# Patient Record
Sex: FEMALE | Race: WHITE | HISPANIC OR LATINO
[De-identification: names, ages, dates, MRNs, and addresses within clinical notes are randomized per-mention and may not be internally consistent; named-entity substitution may affect disease eponyms.]

---

## 2017-07-12 ENCOUNTER — APPOINTMENT (OUTPATIENT)
Dept: UROLOGY | Facility: CLINIC | Age: 57
End: 2017-07-12

## 2017-08-09 ENCOUNTER — MEDICATION RENEWAL (OUTPATIENT)
Age: 57
End: 2017-08-09

## 2017-08-23 ENCOUNTER — APPOINTMENT (OUTPATIENT)
Dept: UROLOGY | Facility: CLINIC | Age: 57
End: 2017-08-23
Payer: COMMERCIAL

## 2017-08-23 VITALS
HEART RATE: 63 BPM | HEIGHT: 59 IN | WEIGHT: 170 LBS | SYSTOLIC BLOOD PRESSURE: 118 MMHG | BODY MASS INDEX: 34.27 KG/M2 | RESPIRATION RATE: 16 BRPM | DIASTOLIC BLOOD PRESSURE: 76 MMHG | TEMPERATURE: 98 F

## 2017-08-23 PROCEDURE — 99213 OFFICE O/P EST LOW 20 MIN: CPT

## 2018-02-26 ENCOUNTER — OUTPATIENT (OUTPATIENT)
Dept: OUTPATIENT SERVICES | Facility: HOSPITAL | Age: 58
LOS: 1 days | End: 2018-02-26
Payer: COMMERCIAL

## 2018-02-26 ENCOUNTER — APPOINTMENT (OUTPATIENT)
Dept: UROLOGY | Facility: CLINIC | Age: 58
End: 2018-02-26
Payer: COMMERCIAL

## 2018-02-26 VITALS
HEIGHT: 59 IN | RESPIRATION RATE: 17 BRPM | HEART RATE: 66 BPM | SYSTOLIC BLOOD PRESSURE: 123 MMHG | BODY MASS INDEX: 31.85 KG/M2 | DIASTOLIC BLOOD PRESSURE: 80 MMHG | WEIGHT: 158 LBS | TEMPERATURE: 97.6 F

## 2018-02-26 DIAGNOSIS — R35.0 FREQUENCY OF MICTURITION: ICD-10-CM

## 2018-02-26 PROCEDURE — 76775 US EXAM ABDO BACK WALL LIM: CPT

## 2018-02-26 PROCEDURE — 76775 US EXAM ABDO BACK WALL LIM: CPT | Mod: 26

## 2018-02-26 PROCEDURE — 99213 OFFICE O/P EST LOW 20 MIN: CPT | Mod: 25

## 2018-02-27 DIAGNOSIS — Z87.442 PERSONAL HISTORY OF URINARY CALCULI: ICD-10-CM

## 2018-03-08 ENCOUNTER — MEDICATION RENEWAL (OUTPATIENT)
Age: 58
End: 2018-03-08

## 2018-08-27 ENCOUNTER — APPOINTMENT (OUTPATIENT)
Dept: UROLOGY | Facility: CLINIC | Age: 58
End: 2018-08-27

## 2018-10-24 ENCOUNTER — APPOINTMENT (OUTPATIENT)
Dept: UROLOGY | Facility: CLINIC | Age: 58
End: 2018-10-24
Payer: COMMERCIAL

## 2018-10-24 PROCEDURE — 99213 OFFICE O/P EST LOW 20 MIN: CPT

## 2019-02-15 ENCOUNTER — APPOINTMENT (OUTPATIENT)
Dept: UROLOGY | Facility: CLINIC | Age: 59
End: 2019-02-15
Payer: COMMERCIAL

## 2019-03-06 ENCOUNTER — OUTPATIENT (OUTPATIENT)
Dept: OUTPATIENT SERVICES | Facility: HOSPITAL | Age: 59
LOS: 1 days | End: 2019-03-06
Payer: COMMERCIAL

## 2019-03-06 ENCOUNTER — APPOINTMENT (OUTPATIENT)
Dept: UROLOGY | Facility: CLINIC | Age: 59
End: 2019-03-06
Payer: COMMERCIAL

## 2019-03-06 VITALS
TEMPERATURE: 98.4 F | SYSTOLIC BLOOD PRESSURE: 128 MMHG | HEART RATE: 76 BPM | HEIGHT: 59 IN | BODY MASS INDEX: 34.27 KG/M2 | RESPIRATION RATE: 17 BRPM | WEIGHT: 170 LBS | DIASTOLIC BLOOD PRESSURE: 79 MMHG

## 2019-03-06 DIAGNOSIS — R35.0 FREQUENCY OF MICTURITION: ICD-10-CM

## 2019-03-06 PROCEDURE — 99213 OFFICE O/P EST LOW 20 MIN: CPT | Mod: 25

## 2019-03-06 PROCEDURE — 76775 US EXAM ABDO BACK WALL LIM: CPT | Mod: 26

## 2019-03-06 PROCEDURE — 76775 US EXAM ABDO BACK WALL LIM: CPT

## 2019-03-06 NOTE — ASSESSMENT
[FreeTextEntry1] : US today shows possible 6mm stone vs. calcification in the right kidney near parenchyma and a 2.7mm in lower pole left kidney.\par Strongly advised pt to increase fluid intake. \par Advised pt to continue HCTZ and K Chloride (she has not been taking K Chloride routinely).\par LL in 5 months and RTO in 6 months for results and US

## 2019-03-06 NOTE — ADDENDUM
[FreeTextEntry1] :  I, Nathalie Brasher, acted solely as a scribe for Dr. Lucho Carrion. The documentation recorded by the scribe accurately reflects the service I personally performed and the decision by me.\par

## 2019-03-06 NOTE — HISTORY OF PRESENT ILLNESS
[FreeTextEntry1] : 58 year old female presents for evaluation of test results.\par  passed away in July 2018 from diabetes/HTN/and alcoholism.\par She takes care of 3 children at home. \par US today shows possible 6mm stone vs. calcification in the right kidney near parenchyma and a 2.7mm in lower pole left kidney. \par LL from 02/20/19 shows low output still and high Ca level she is SS CaOx of 13.6 and SS CaP.\par Strongly advised pt to increase fluid intake. \par Advised pt to continue HCTZ and K Chloride (she has not been taking K Chloride routinely).\par Renewed HCTZ\par LL in 5 months and RTO in 6 months for results and US\par

## 2019-03-07 DIAGNOSIS — N20.0 CALCULUS OF KIDNEY: ICD-10-CM

## 2019-07-22 ENCOUNTER — APPOINTMENT (OUTPATIENT)
Dept: OBGYN | Facility: CLINIC | Age: 59
End: 2019-07-22
Payer: COMMERCIAL

## 2019-07-22 VITALS
SYSTOLIC BLOOD PRESSURE: 109 MMHG | HEIGHT: 59 IN | WEIGHT: 177 LBS | BODY MASS INDEX: 35.68 KG/M2 | DIASTOLIC BLOOD PRESSURE: 74 MMHG

## 2019-07-22 PROCEDURE — 99396 PREV VISIT EST AGE 40-64: CPT

## 2019-07-22 NOTE — HISTORY OF PRESENT ILLNESS
[1 Year Ago] : 1 year ago [Good] : being in good health [Healthy Diet] : a healthy diet [Regular Exercise] : regular exercise [Weight Concerns] : no concerns with her weight [Last Pap Smear ___] : last Papanicolaou cytology done [unfilled] [Mammogram Last Year] : a mammogram last year [Colonoscopy Within 10 Years] : a colonoscopy within the past ten years [Menstrual Problems] : reports normal menses [Sexually Active] : is sexually active [Up to Date] : up to date with ~his/her~ STD screening

## 2019-07-25 LAB — HPV HIGH+LOW RISK DNA PNL CVX: NOT DETECTED

## 2019-07-26 LAB — CYTOLOGY CVX/VAG DOC THIN PREP: NORMAL

## 2019-09-20 ENCOUNTER — MED ADMIN CHARGE (OUTPATIENT)
Age: 59
End: 2019-09-20

## 2019-09-20 ENCOUNTER — APPOINTMENT (OUTPATIENT)
Age: 59
End: 2019-09-20
Payer: COMMERCIAL

## 2019-09-20 ENCOUNTER — OUTPATIENT (OUTPATIENT)
Dept: OUTPATIENT SERVICES | Facility: HOSPITAL | Age: 59
LOS: 1 days | End: 2019-09-20
Payer: COMMERCIAL

## 2019-09-20 DIAGNOSIS — R35.0 FREQUENCY OF MICTURITION: ICD-10-CM

## 2019-09-20 PROCEDURE — 99213 OFFICE O/P EST LOW 20 MIN: CPT | Mod: 25

## 2019-09-20 PROCEDURE — 76775 US EXAM ABDO BACK WALL LIM: CPT | Mod: 26

## 2019-09-20 PROCEDURE — 76775 US EXAM ABDO BACK WALL LIM: CPT

## 2019-09-20 NOTE — ASSESSMENT
[FreeTextEntry1] : US today again visualized bilateral non obstructing calculi. There is a 4.0 x 5.6 mm mid stone vs parenchymal calcification stable in size a 2.7 mm in the lower pole right kidney and in the left 2.7 mm lower pole stable in size. Both kidneys are normal in size and echogenicity without obvious hydronephrosis or solid masses visualized. \par LL from 09/09/19 shows very low output, SS CaOx, elevated Ca, SS CaP, SS UA and elevated UA.\par Pt was advised to increase fluid intake. \par LL in 5 months and RTO in 6 months for results and US

## 2019-09-20 NOTE — ADDENDUM
[FreeTextEntry1] :  I, Nathalie Brasher, acted solely as a scribe for Dr. Lucho Carrion. The documentation recorded by the scribe accurately reflects the service I personally performed and the decision by me.

## 2019-09-20 NOTE — HISTORY OF PRESENT ILLNESS
[FreeTextEntry1] : 58 year old female presents for evaluation of test results.\par  passed away in July 2018 from diabetes/HTN/and alcoholism.\par She takes care of 3 children at home. \par \par US today again visualized bilateral non obstructing calculi. There is a 4.0 x 5.6 mm mid stone vs parenchymal calcification stable in size a 2.7 mm in the lower pole right kidney and in the left 2.7 mm lower pole stable in size. Both kidneys are normal in size and echogenicity without obvious hydronephrosis or solid masses visualized. \par LL from 09/09/19 shows very low output, SS CaOx, elevated Ca, SS CaP, SS UA and elevated UA.\par Pt was advised to increase fluid intake. \par LL in 5 months and RTO in 6 months for results and US

## 2019-09-23 DIAGNOSIS — N20.0 CALCULUS OF KIDNEY: ICD-10-CM

## 2019-09-28 ENCOUNTER — EMERGENCY (EMERGENCY)
Facility: HOSPITAL | Age: 59
LOS: 0 days | Discharge: HOME | End: 2019-09-28
Attending: EMERGENCY MEDICINE
Payer: OTHER MISCELLANEOUS

## 2019-09-28 VITALS
HEART RATE: 71 BPM | OXYGEN SATURATION: 98 % | DIASTOLIC BLOOD PRESSURE: 74 MMHG | TEMPERATURE: 98 F | SYSTOLIC BLOOD PRESSURE: 118 MMHG | RESPIRATION RATE: 17 BRPM

## 2019-09-28 VITALS
TEMPERATURE: 99 F | SYSTOLIC BLOOD PRESSURE: 125 MMHG | HEIGHT: 59 IN | RESPIRATION RATE: 18 BRPM | DIASTOLIC BLOOD PRESSURE: 68 MMHG | WEIGHT: 179.9 LBS | OXYGEN SATURATION: 99 % | HEART RATE: 83 BPM
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
## 2019-09-28 DIAGNOSIS — M79.641 PAIN IN RIGHT HAND: ICD-10-CM

## 2019-09-28 DIAGNOSIS — S69.91XA UNSPECIFIED INJURY OF RIGHT WRIST, HAND AND FINGER(S), INITIAL ENCOUNTER: ICD-10-CM
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
## 2019-09-28 DIAGNOSIS — J45.909 UNSPECIFIED ASTHMA, UNCOMPLICATED: ICD-10-CM
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
## 2019-09-28 PROCEDURE — 99053 MED SERV 10PM-8AM 24 HR FAC: CPT

## 2019-09-28 PROCEDURE — 99283 EMERGENCY DEPT VISIT LOW MDM: CPT

## 2019-09-28 PROCEDURE — 73130 X-RAY EXAM OF HAND: CPT | Mod: 26,RT

## 2019-09-28 RX ORDER — OXYCODONE AND ACETAMINOPHEN 5; 325 MG/1; MG/1
1 TABLET ORAL
Qty: 12 | Refills: 0
Start: 2019-09-28 | End: 2019-09-30

## 2019-09-28 RX ORDER — IBUPROFEN 200 MG
1 TABLET ORAL
Qty: 28 | Refills: 0
Start: 2019-09-28 | End: 2019-10-04

## 2019-09-28 RX ORDER — OXYCODONE AND ACETAMINOPHEN 5; 325 MG/1; MG/1
0.5 TABLET ORAL ONCE
Refills: 0 | Status: DISCONTINUED | OUTPATIENT
Start: 2019-09-28 | End: 2019-09-28

## 2019-09-28 RX ADMIN — OXYCODONE AND ACETAMINOPHEN 0.5 TABLET(S): 5; 325 TABLET ORAL at 06:03

## 2019-09-28 NOTE — ED ADULT NURSE NOTE - PMH
Asthma  - on singulair    Diverticulitis    Gall Stone    History of Urinary Tract Infection - on augmentin

## 2019-09-28 NOTE — ED ADULT NURSE NOTE - OBJECTIVE STATEMENT
58y female received in bed 9 c/o of right hand pain, swelling and small laceration s/p shelf falling on hand at work x 3am. able to move fingers. no s/s of acute distress noted. will continue to monitor and provide care as needed.

## 2019-09-28 NOTE — ED PROVIDER NOTE - PATIENT PORTAL LINK FT
You can access the FollowMyHealth Patient Portal offered by [facility] by registering at the following website: [email or web address]. By joining [facility]’s FollowMyHealth portal, you will also be able to view your health information using other applications (apps) compatible with our system.

## 2019-09-28 NOTE — ED PROVIDER NOTE - OBJECTIVE STATEMENT
Pertinent PMH/PSH/FHx/SHx and Review of Systems contained within:    59yo F w PMH of asthma presents to ED for eval s/p crush injury to R hand.  Pt was at work brandon a shelf fell down onto R hand.  Denies other injuries.  Pt is R handed.    No fever/chills, No photophobia/eye pain/changes in vision, No ear pain/sore throat/dysphagia, No chest pain/palpitations, no SOB/cough/wheeze/stridor, No abdominal pain, No neck/back pain, no rash, no changes in neurological status/function.

## 2019-09-28 NOTE — ED ADULT NURSE NOTE - ED STAT RN HANDOFF DETAILS
Report endorsed to oncoming RN. Safety checks compld this shift/Safety rounds completed hourly.  WDL. Meds given as ord with no s/s of adverse RXNs. Fall +skin precs in place. Any issues endorsed to oncoming RN for follow up.

## 2019-09-28 NOTE — ED PROVIDER NOTE - CLINICAL SUMMARY MEDICAL DECISION MAKING FREE TEXT BOX
Pt w crush injury to R hand, XR neg for fx.  Wounds dressed w abx ointment & pt instructed on care.  Given Hand follow up.  Pt given Rx and instructed/cautioned on use. Dc home to follow up w PMD & Dr. Lemus, instructed to return to ED if sx worsen.

## 2019-09-28 NOTE — ED PROVIDER NOTE - PHYSICAL EXAMINATION
Gen: Alert, c/o pain  Head: NC, AT, EOMI, normal lids/conjunctiva  ENT: normal hearing, patent oropharynx, MMM  Neck: supple, no tenderness/meningismus, FROM, Trachea midline  Pulm: Bilateral clear BS, normal resp effort, no wheeze/stridor/retractions  CV: RRR, no M/R/G, +dist pulses  Abd: soft, NT/ND, +BS, no guarding/rebound tenderness  Mskel: +small lacerations/abrasions between R index & middle finger, no active bleeding, +swelling & ecchymoses over 2nd-5th MCP joints, sensation intact, cap refill <2sec, radial pulse +2, decr ROM at MCP joints due to pain  Skin: no rash  Neuro: no sensory/motor deficits

## 2019-09-28 NOTE — ED ADULT TRIAGE NOTE - CHIEF COMPLAINT QUOTE
Pt c/o R-hand pain and swelling. Pt stated a top shelf at work fell down onto hand. swelling and small laceration seen R hand in triage

## 2019-10-04 ENCOUNTER — MOBILE ON CALL (OUTPATIENT)
Age: 59
End: 2019-10-04

## 2019-10-07 ENCOUNTER — MOBILE ON CALL (OUTPATIENT)
Age: 59
End: 2019-10-07

## 2019-10-14 ENCOUNTER — APPOINTMENT (OUTPATIENT)
Dept: CARDIOLOGY | Facility: CLINIC | Age: 59
End: 2019-10-14
Payer: COMMERCIAL

## 2019-10-14 ENCOUNTER — NON-APPOINTMENT (OUTPATIENT)
Age: 59
End: 2019-10-14

## 2019-10-14 VITALS
HEART RATE: 84 BPM | DIASTOLIC BLOOD PRESSURE: 80 MMHG | BODY MASS INDEX: 35.48 KG/M2 | SYSTOLIC BLOOD PRESSURE: 100 MMHG | HEIGHT: 59 IN | RESPIRATION RATE: 16 BRPM | WEIGHT: 176 LBS

## 2019-10-14 DIAGNOSIS — Z01.419 ENCOUNTER FOR GYNECOLOGICAL EXAMINATION (GENERAL) (ROUTINE) W/OUT ABNORMAL FINDINGS: ICD-10-CM

## 2019-10-14 PROCEDURE — 99214 OFFICE O/P EST MOD 30 MIN: CPT

## 2019-10-14 PROCEDURE — 93000 ELECTROCARDIOGRAM COMPLETE: CPT

## 2019-10-14 RX ORDER — HYDROCHLOROTHIAZIDE 25 MG/1
25 TABLET ORAL DAILY
Qty: 90 | Refills: 3 | Status: DISCONTINUED | COMMUNITY
Start: 2018-10-24 | End: 2019-10-14

## 2019-10-14 RX ORDER — HYDROCHLOROTHIAZIDE 25 MG/1
25 TABLET ORAL DAILY
Qty: 90 | Refills: 3 | Status: DISCONTINUED | COMMUNITY
Start: 2019-02-12 | End: 2019-10-14

## 2019-10-14 RX ORDER — HYDROCHLOROTHIAZIDE 25 MG/1
25 TABLET ORAL DAILY
Qty: 90 | Refills: 3 | Status: DISCONTINUED | COMMUNITY
Start: 2018-03-08 | End: 2019-10-14

## 2019-10-14 RX ORDER — HYDROCHLOROTHIAZIDE 25 MG/1
25 TABLET ORAL DAILY
Qty: 90 | Refills: 3 | Status: DISCONTINUED | COMMUNITY
Start: 2019-03-06 | End: 2019-10-14

## 2019-10-14 RX ORDER — HYDROCHLOROTHIAZIDE 25 MG/1
25 TABLET ORAL DAILY
Qty: 90 | Refills: 3 | Status: DISCONTINUED | COMMUNITY
Start: 2017-08-15 | End: 2019-10-14

## 2019-10-14 RX ORDER — POTASSIUM CHLORIDE 750 MG/1
10 CAPSULE, EXTENDED RELEASE ORAL
Qty: 90 | Refills: 3 | Status: DISCONTINUED | COMMUNITY
Start: 2018-10-24 | End: 2019-10-14

## 2019-10-14 NOTE — HISTORY OF PRESENT ILLNESS
[FreeTextEntry1] : Patient returns to the office having seen me for a few years and having recently had an episode of chest pain which prompted a visit to the ER. There she was found to have had a non-ST elevation MI and underwent catheterization showing total occlusion of the first diagonal where she received 2 stents. She tolerated this well and was discharged. Echocardiogram showed preserved EF and no significant wall motion abnormality. She did have a CTA of the chest to rule out PE which demonstrated a left lung mass but was otherwise unremarkable. Since being home she continues to report some pressure in the chest but no symptoms of chest pain similar to what she had at the time of the MI. She also complains of some discomfort and swelling in the right groin where she had her cath.  Patient denies shortness of breath, palpitations, orthopnea, presyncope, syncope.

## 2019-10-14 NOTE — PHYSICAL EXAM
[General Appearance - In No Acute Distress] : no acute distress [Normal Oral Mucosa] : normal oral mucosa [Normal Conjunctiva] : the conjunctiva exhibited no abnormalities [Auscultation Breath Sounds / Voice Sounds] : lungs were clear to auscultation bilaterally [Abdomen Soft] : soft [Abnormal Walk] : normal gait [Abdomen Tenderness] : non-tender [Nail Clubbing] : no clubbing of the fingernails [Skin Color & Pigmentation] : normal skin color and pigmentation [Cyanosis, Localized] : no localized cyanosis [Affect] : the affect was normal [Oriented To Time, Place, And Person] : oriented to person, place, and time [Normal Rate] : normal [Rhythm Regular] : regular [Normal S1] : normal S1 [Normal S2] : normal S2 [I] : a grade 1 [S3] : no S3 [S4] : no S4 [FreeTextEntry1] : R groin with ecchymosis and small hematoma noted

## 2019-10-14 NOTE — DISCUSSION/SUMMARY
[FreeTextEntry1] : 1. Decrease metoprolol to b.i.d.\par 2. She will continue off HCTZ and follow up with urology regarding her kidney stones.\par 3. Continue other current cardiac meds in doses as noted above for CAD.\par 4. She will have pre-rehabilitation stress test and start cardiac rehabilitation in about a month.\par 5. She will remain out of work for at least the next few months given the heavy work that she does in her recent MI.\par 6. Follow up here in six weeks.

## 2019-10-14 NOTE — ASSESSMENT
[FreeTextEntry1] : EKG: Sinus rhythm with no significant ST or T wave changes.  Frequent PVCs.\par \par 58-year-old female with a past medical history of pericarditis and asthma who recently presented with chest pain to the ER was found to have a non-ST elevation MI. Patient successfully received 2 stents. Echocardiogram showed a preserved EF. No evidence of heart failure. Patient still having some mild chest discomfort but this is likely more pericarditic in nature. Evaluation of her groin does show some ecchymosis and a small hematoma. There is certainly no evidence of any ongoing ischemia at this time. Blood pressure continues to be on the low side however.

## 2019-11-01 ENCOUNTER — RX RENEWAL (OUTPATIENT)
Age: 59
End: 2019-11-01

## 2019-11-01 ENCOUNTER — MEDICATION RENEWAL (OUTPATIENT)
Age: 59
End: 2019-11-01

## 2019-11-01 RX ORDER — ASPIRIN 81 MG
81 TABLET, DELAYED RELEASE (ENTERIC COATED) ORAL DAILY
Refills: 3 | Status: DISCONTINUED | COMMUNITY
End: 2019-11-01

## 2019-11-04 ENCOUNTER — APPOINTMENT (OUTPATIENT)
Dept: CARDIOLOGY | Facility: CLINIC | Age: 59
End: 2019-11-04
Payer: COMMERCIAL

## 2019-11-04 PROCEDURE — 93015 CV STRESS TEST SUPVJ I&R: CPT

## 2019-11-11 ENCOUNTER — APPOINTMENT (OUTPATIENT)
Dept: CARDIOLOGY | Facility: CLINIC | Age: 59
End: 2019-11-11
Payer: COMMERCIAL

## 2019-11-11 PROCEDURE — 93798 PHYS/QHP OP CAR RHAB W/ECG: CPT

## 2019-11-13 ENCOUNTER — APPOINTMENT (OUTPATIENT)
Dept: CARDIOLOGY | Facility: CLINIC | Age: 59
End: 2019-11-13

## 2019-11-15 ENCOUNTER — APPOINTMENT (OUTPATIENT)
Dept: CARDIOLOGY | Facility: CLINIC | Age: 59
End: 2019-11-15

## 2019-11-18 ENCOUNTER — NON-APPOINTMENT (OUTPATIENT)
Age: 59
End: 2019-11-18

## 2019-11-18 ENCOUNTER — APPOINTMENT (OUTPATIENT)
Dept: CARDIOLOGY | Facility: CLINIC | Age: 59
End: 2019-11-18

## 2019-11-18 ENCOUNTER — APPOINTMENT (OUTPATIENT)
Dept: CARDIOLOGY | Facility: CLINIC | Age: 59
End: 2019-11-18
Payer: COMMERCIAL

## 2019-11-18 VITALS
BODY MASS INDEX: 35.48 KG/M2 | WEIGHT: 176 LBS | HEIGHT: 59 IN | OXYGEN SATURATION: 99 % | RESPIRATION RATE: 15 BRPM | HEART RATE: 75 BPM | DIASTOLIC BLOOD PRESSURE: 68 MMHG | SYSTOLIC BLOOD PRESSURE: 101 MMHG

## 2019-11-18 PROCEDURE — 93000 ELECTROCARDIOGRAM COMPLETE: CPT

## 2019-11-18 PROCEDURE — 99214 OFFICE O/P EST MOD 30 MIN: CPT

## 2019-11-18 RX ORDER — METOPROLOL TARTRATE 25 MG/1
25 TABLET, FILM COATED ORAL TWICE DAILY
Qty: 180 | Refills: 1 | Status: DISCONTINUED | COMMUNITY
Start: 1900-01-01 | End: 2019-11-18

## 2019-11-18 NOTE — PHYSICAL EXAM
[Normal Conjunctiva] : the conjunctiva exhibited no abnormalities [General Appearance - In No Acute Distress] : no acute distress [Normal Oral Mucosa] : normal oral mucosa [Abdomen Soft] : soft [Auscultation Breath Sounds / Voice Sounds] : lungs were clear to auscultation bilaterally [Abnormal Walk] : normal gait [Abdomen Tenderness] : non-tender [Cyanosis, Localized] : no localized cyanosis [Nail Clubbing] : no clubbing of the fingernails [Oriented To Time, Place, And Person] : oriented to person, place, and time [Skin Color & Pigmentation] : normal skin color and pigmentation [Affect] : the affect was normal [Normal Rate] : normal [Rhythm Regular] : regular [Normal S1] : normal S1 [Normal S2] : normal S2 [I] : a grade 1 [S4] : no S4 [S3] : no S3 [FreeTextEntry1] : R groin with ecchymosis and small hematoma noted

## 2019-11-18 NOTE — HISTORY OF PRESENT ILLNESS
[FreeTextEntry1] : Patient returns to office today unfortunately having been diagnosed now with lung cancer. She had a biopsy for which Plavix was held for 3 days although I was not consulted prior to it being done. There were apparently no complications. She is now following up and may need a surgical evaluation. Symptomatically she reports a lot of fatigue when she exerts herself especially going up stairs. She also has chest pain with inspiration. No other specific symptoms at this time. She did one session of cardiac rehabilitation, reporting a lot of fatigue but she did get through it. Patient denies palpitations, orthopnea, presyncope, syncope.

## 2019-11-18 NOTE — DISCUSSION/SUMMARY
[FreeTextEntry1] : 1. Change metoprolol tartrate to succinate 50 mg daily to improve compliance. Enforced upon her the need to be compliant with her other medications as well.\par 2. Continue other current cardiac meds in doses as noted above for CAD.\par 3. Followup with pulmonary and ultimately thoracic surgery regarding her lung cancer. If it is determined that she needs to have surgery this likely will need to be done withholding of her antiplatelet therapy. The shortest duration of holding the medication would be recommended with careful monitoring. Might consider inpatient time period.\par 4. She can return to work at this time doing light duty.\par 5. She is encouraged to continue with cardiac rehabilitation.\par 6. Follow up here in six weeks.

## 2019-11-18 NOTE — ASSESSMENT
[FreeTextEntry1] : EKG: Sinus rhythm with nonspecific T wave changes.\par \par 59-year-old female with a past medical history of pericarditis and asthma who recently presented with chest pain to the ER was found to have a non-ST elevation MI s/p  2 stents presenting for followup. Patient unfortunately has been diagnosed with lung cancer and is currently undergoing further evaluation. She may ultimately require surgery for the cancer. Unfortunately given her recent stenting there is a significant danger and holding her telemetry platelet therapy for extended periods of time. She would be at risk for stent thrombosis although the longer we can wait to hold the medication to better. Given the importance of having surgery to remove the cancer possibly ultimately this is likely a chance we would have to take. At this time there is no evidence of recurrent ischemia or CHF. Blood pressure is controlled. She did one episode of cardiac rehabilitation and did have some ventricular ectopy which appears to be asymptomatic.

## 2019-11-20 ENCOUNTER — APPOINTMENT (OUTPATIENT)
Dept: CARDIOLOGY | Facility: CLINIC | Age: 59
End: 2019-11-20

## 2019-11-22 ENCOUNTER — APPOINTMENT (OUTPATIENT)
Dept: CARDIOLOGY | Facility: CLINIC | Age: 59
End: 2019-11-22

## 2019-11-25 ENCOUNTER — APPOINTMENT (OUTPATIENT)
Dept: CARDIOLOGY | Facility: CLINIC | Age: 59
End: 2019-11-25
Payer: COMMERCIAL

## 2019-11-25 PROCEDURE — 93798 PHYS/QHP OP CAR RHAB W/ECG: CPT

## 2019-11-27 ENCOUNTER — APPOINTMENT (OUTPATIENT)
Dept: CARDIOLOGY | Facility: CLINIC | Age: 59
End: 2019-11-27

## 2019-12-02 ENCOUNTER — APPOINTMENT (OUTPATIENT)
Dept: CARDIOLOGY | Facility: CLINIC | Age: 59
End: 2019-12-02

## 2019-12-04 ENCOUNTER — APPOINTMENT (OUTPATIENT)
Dept: CARDIOLOGY | Facility: CLINIC | Age: 59
End: 2019-12-04
Payer: COMMERCIAL

## 2019-12-04 PROCEDURE — 93798 PHYS/QHP OP CAR RHAB W/ECG: CPT

## 2019-12-05 ENCOUNTER — APPOINTMENT (OUTPATIENT)
Dept: CARDIOLOGY | Facility: CLINIC | Age: 59
End: 2019-12-05

## 2019-12-06 ENCOUNTER — APPOINTMENT (OUTPATIENT)
Dept: CARDIOLOGY | Facility: CLINIC | Age: 59
End: 2019-12-06
Payer: COMMERCIAL

## 2019-12-06 PROCEDURE — 93798 PHYS/QHP OP CAR RHAB W/ECG: CPT

## 2019-12-09 ENCOUNTER — APPOINTMENT (OUTPATIENT)
Dept: CARDIOLOGY | Facility: CLINIC | Age: 59
End: 2019-12-09

## 2019-12-11 ENCOUNTER — APPOINTMENT (OUTPATIENT)
Dept: CARDIOLOGY | Facility: CLINIC | Age: 59
End: 2019-12-11

## 2019-12-13 ENCOUNTER — APPOINTMENT (OUTPATIENT)
Dept: CARDIOLOGY | Facility: CLINIC | Age: 59
End: 2019-12-13

## 2019-12-16 ENCOUNTER — APPOINTMENT (OUTPATIENT)
Dept: CARDIOLOGY | Facility: CLINIC | Age: 59
End: 2019-12-16

## 2019-12-18 ENCOUNTER — APPOINTMENT (OUTPATIENT)
Dept: CARDIOLOGY | Facility: CLINIC | Age: 59
End: 2019-12-18

## 2019-12-20 ENCOUNTER — APPOINTMENT (OUTPATIENT)
Dept: CARDIOLOGY | Facility: CLINIC | Age: 59
End: 2019-12-20

## 2019-12-23 ENCOUNTER — APPOINTMENT (OUTPATIENT)
Dept: CARDIOLOGY | Facility: CLINIC | Age: 59
End: 2019-12-23

## 2019-12-27 ENCOUNTER — APPOINTMENT (OUTPATIENT)
Dept: CARDIOLOGY | Facility: CLINIC | Age: 59
End: 2019-12-27

## 2019-12-30 ENCOUNTER — APPOINTMENT (OUTPATIENT)
Dept: CARDIOLOGY | Facility: CLINIC | Age: 59
End: 2019-12-30

## 2019-12-31 ENCOUNTER — APPOINTMENT (OUTPATIENT)
Dept: CARDIOLOGY | Facility: CLINIC | Age: 59
End: 2019-12-31
Payer: COMMERCIAL

## 2019-12-31 ENCOUNTER — NON-APPOINTMENT (OUTPATIENT)
Age: 59
End: 2019-12-31

## 2019-12-31 VITALS
OXYGEN SATURATION: 100 % | HEIGHT: 59 IN | HEART RATE: 70 BPM | DIASTOLIC BLOOD PRESSURE: 74 MMHG | RESPIRATION RATE: 15 BRPM | SYSTOLIC BLOOD PRESSURE: 117 MMHG | WEIGHT: 179 LBS | BODY MASS INDEX: 36.08 KG/M2

## 2019-12-31 DIAGNOSIS — C34.90 MALIGNANT NEOPLASM OF UNSPECIFIED PART OF UNSPECIFIED BRONCHUS OR LUNG: ICD-10-CM

## 2019-12-31 DIAGNOSIS — D86.9 SARCOIDOSIS, UNSPECIFIED: ICD-10-CM

## 2019-12-31 PROCEDURE — 93000 ELECTROCARDIOGRAM COMPLETE: CPT

## 2019-12-31 PROCEDURE — 99214 OFFICE O/P EST MOD 30 MIN: CPT

## 2019-12-31 NOTE — DISCUSSION/SUMMARY
[FreeTextEntry1] : 1. Continue current cardiac meds in doses as noted above for CAD.\par 2. Followup with pulmonary and thoracic surgery regarding her lung cancer. If needed Plavix may be held for 5 days for the purpose of surgery. Ideally she would remain on aspirin, but ultimately this could be held as well.\par 3. She can continue to work at this time doing light duty.\par 4. She is encouraged to continue with cardiac rehabilitation. She will look into alternative options with better timing.\par 5. Patient encouraged to work on diet to lose weight.\par 6. Follow up here in six weeks.

## 2019-12-31 NOTE — HISTORY OF PRESENT ILLNESS
[FreeTextEntry1] : Patient returns to the office today having recently been in the hospital following a dog bite on her right hand. This was ultimately treated conservatively without surgery and is improving. She had a second opinion regarding her lung cancer at [facility] and is going to proceed with them. Currently the plan is for a biopsy of the actual lung mass, as only a lymph node was biopsied previously. She has returned back to work at light duty since her injury. She reports no symptoms and doing her work her through her normal daily activities. Patient denies chest pain, shortness of breath, palpitations, orthopnea, presyncope, syncope.

## 2019-12-31 NOTE — PHYSICAL EXAM
[General Appearance - In No Acute Distress] : no acute distress [Normal Conjunctiva] : the conjunctiva exhibited no abnormalities [Normal Oral Mucosa] : normal oral mucosa [Auscultation Breath Sounds / Voice Sounds] : lungs were clear to auscultation bilaterally [Normal Rate] : normal [Rhythm Regular] : regular [Normal S1] : normal S1 [Normal S2] : normal S2 [I] : a grade 1 [Abdomen Soft] : soft [Abdomen Tenderness] : non-tender [Abnormal Walk] : normal gait [Nail Clubbing] : no clubbing of the fingernails [Cyanosis, Localized] : no localized cyanosis [Skin Color & Pigmentation] : normal skin color and pigmentation [Oriented To Time, Place, And Person] : oriented to person, place, and time [Affect] : the affect was normal [S3] : no S3 [S4] : no S4 [FreeTextEntry1] : R groin with ecchymosis and small hematoma noted

## 2019-12-31 NOTE — ASSESSMENT
[FreeTextEntry1] : EKG: Sinus rhythm with borderline nonspecific T wave changes.\par \par 59-year-old female with a past medical history of CAD s/p MI and stents pericarditis, asthma, recently diagnosed lung cancer and sarcoidosis who presents for followup. Patient was recently discharged from the hospital following an episode in which she had a dog bite and cellulitis in the right hand. That has now resolved. She is now planning a lung biopsy and likely ultimately surgery. I did have a discussion with her interventionalist who feels that after 3 months, which would be January 5, 2020, it would be reasonable to hold at least her Plavix and really her aspirin as well if needed for surgery. Patient has returned to work at light duty and is having no symptoms or evidence of recurrent angina. Unfortunately this has caused her to stop doing cardiac rehabilitation. Blood pressure is controlled.

## 2020-01-03 ENCOUNTER — APPOINTMENT (OUTPATIENT)
Dept: CARDIOLOGY | Facility: CLINIC | Age: 60
End: 2020-01-03

## 2020-01-06 ENCOUNTER — APPOINTMENT (OUTPATIENT)
Dept: CARDIOLOGY | Facility: CLINIC | Age: 60
End: 2020-01-06

## 2020-01-08 ENCOUNTER — APPOINTMENT (OUTPATIENT)
Dept: CARDIOLOGY | Facility: CLINIC | Age: 60
End: 2020-01-08

## 2020-01-10 ENCOUNTER — APPOINTMENT (OUTPATIENT)
Dept: CARDIOLOGY | Facility: CLINIC | Age: 60
End: 2020-01-10

## 2020-01-10 ENCOUNTER — INBOUND DOCUMENT (OUTPATIENT)
Age: 60
End: 2020-01-10

## 2020-01-13 ENCOUNTER — APPOINTMENT (OUTPATIENT)
Dept: CARDIOLOGY | Facility: CLINIC | Age: 60
End: 2020-01-13

## 2020-01-15 ENCOUNTER — APPOINTMENT (OUTPATIENT)
Dept: CARDIOLOGY | Facility: CLINIC | Age: 60
End: 2020-01-15

## 2020-01-17 ENCOUNTER — APPOINTMENT (OUTPATIENT)
Dept: CARDIOLOGY | Facility: CLINIC | Age: 60
End: 2020-01-17

## 2020-01-22 ENCOUNTER — APPOINTMENT (OUTPATIENT)
Dept: CARDIOLOGY | Facility: CLINIC | Age: 60
End: 2020-01-22

## 2020-01-24 ENCOUNTER — APPOINTMENT (OUTPATIENT)
Dept: CARDIOLOGY | Facility: CLINIC | Age: 60
End: 2020-01-24

## 2020-01-27 ENCOUNTER — APPOINTMENT (OUTPATIENT)
Dept: CARDIOLOGY | Facility: CLINIC | Age: 60
End: 2020-01-27

## 2020-01-29 ENCOUNTER — APPOINTMENT (OUTPATIENT)
Dept: CARDIOLOGY | Facility: CLINIC | Age: 60
End: 2020-01-29

## 2020-01-31 ENCOUNTER — APPOINTMENT (OUTPATIENT)
Dept: CARDIOLOGY | Facility: CLINIC | Age: 60
End: 2020-01-31

## 2020-02-03 ENCOUNTER — APPOINTMENT (OUTPATIENT)
Dept: CARDIOLOGY | Facility: CLINIC | Age: 60
End: 2020-02-03

## 2020-02-05 ENCOUNTER — APPOINTMENT (OUTPATIENT)
Dept: CARDIOLOGY | Facility: CLINIC | Age: 60
End: 2020-02-05

## 2020-02-07 ENCOUNTER — APPOINTMENT (OUTPATIENT)
Dept: CARDIOLOGY | Facility: CLINIC | Age: 60
End: 2020-02-07

## 2020-02-10 ENCOUNTER — APPOINTMENT (OUTPATIENT)
Dept: CARDIOLOGY | Facility: CLINIC | Age: 60
End: 2020-02-10

## 2020-02-12 ENCOUNTER — APPOINTMENT (OUTPATIENT)
Dept: CARDIOLOGY | Facility: CLINIC | Age: 60
End: 2020-02-12

## 2020-02-14 ENCOUNTER — APPOINTMENT (OUTPATIENT)
Dept: CARDIOLOGY | Facility: CLINIC | Age: 60
End: 2020-02-14

## 2020-02-19 ENCOUNTER — APPOINTMENT (OUTPATIENT)
Dept: CARDIOLOGY | Facility: CLINIC | Age: 60
End: 2020-02-19

## 2020-02-21 ENCOUNTER — APPOINTMENT (OUTPATIENT)
Dept: CARDIOLOGY | Facility: CLINIC | Age: 60
End: 2020-02-21

## 2020-02-24 ENCOUNTER — APPOINTMENT (OUTPATIENT)
Dept: CARDIOLOGY | Facility: CLINIC | Age: 60
End: 2020-02-24

## 2020-02-25 ENCOUNTER — NON-APPOINTMENT (OUTPATIENT)
Age: 60
End: 2020-02-25

## 2020-02-25 ENCOUNTER — APPOINTMENT (OUTPATIENT)
Dept: CARDIOLOGY | Facility: CLINIC | Age: 60
End: 2020-02-25
Payer: COMMERCIAL

## 2020-02-25 VITALS
HEIGHT: 59 IN | BODY MASS INDEX: 36.89 KG/M2 | HEART RATE: 89 BPM | SYSTOLIC BLOOD PRESSURE: 141 MMHG | RESPIRATION RATE: 15 BRPM | WEIGHT: 183 LBS | DIASTOLIC BLOOD PRESSURE: 91 MMHG

## 2020-02-25 VITALS — DIASTOLIC BLOOD PRESSURE: 70 MMHG | SYSTOLIC BLOOD PRESSURE: 102 MMHG

## 2020-02-25 DIAGNOSIS — R31.29 OTHER MICROSCOPIC HEMATURIA: ICD-10-CM

## 2020-02-25 DIAGNOSIS — Z87.440 PERSONAL HISTORY OF URINARY (TRACT) INFECTIONS: ICD-10-CM

## 2020-02-25 PROCEDURE — 99214 OFFICE O/P EST MOD 30 MIN: CPT

## 2020-02-25 PROCEDURE — 93000 ELECTROCARDIOGRAM COMPLETE: CPT

## 2020-02-25 NOTE — HISTORY OF PRESENT ILLNESS
[FreeTextEntry1] : Patient returns to the office today happily reporting that the mass noted in her lung has shrunk considerably and there is no longer any need for intervention. She started taking a supplement called Chemofix and credits that with cause of the tumor regressing. She never had a biopsy done. She reports a lot of fatigue as well as dyspnea on exertion associated with some chest discomfort. The chest discomfort is on the left side of her chest, pressure-like nonradiating beyond the chest. The shortness of breath and chest pain do seem to improve when she stops exerting herself. She has not followed up with pulmonary and is considering a second opinion. Patient denies palpitations, orthopnea, presyncope, syncope.

## 2020-02-25 NOTE — ASSESSMENT
[FreeTextEntry1] : EKG: Sinus rhythm with no significant ST or T wave changes.\par \par 59-year-old female with a past medical history of CAD s/p MI and stents pericarditis, asthma and sarcoidosis who presents for followup. Thankfully the mass the patient had seems to have regressed almost completely and there is no further need for workup or intervention. It is still unclear exactly what the mass was. The patient continues to have issues with dyspnea exertion as well as some chest pain and a lot of issues with fatigue. Her blood pressure tends to run on the low side and it is certainly possible that the metoprolol is contributing. At this time however given her symptoms I would like to plan stress testing prior to making any further decisions. She also needs to follow up with pulmonary and I would recommend a sleep study to further workup her fatigue as well.

## 2020-02-25 NOTE — DISCUSSION/SUMMARY
[FreeTextEntry1] : 1. Continue current cardiac meds in doses as noted above for CAD.\par 2. Check nuclear stress test to further evaluate her chest pain and dyspnea on exertion.\par 3. Monitor BP at home, keep a log and bring to f/u. Consider pulling back on her beta blocker at followup.\par 4. Follow up with pulmonary and consider sleep study.\par 5. Patient encouraged to work on diet to lose weight.\par 6. Follow up here in six weeks.

## 2020-02-25 NOTE — PHYSICAL EXAM
[General Appearance - In No Acute Distress] : no acute distress [Normal Oral Mucosa] : normal oral mucosa [Normal Conjunctiva] : the conjunctiva exhibited no abnormalities [Auscultation Breath Sounds / Voice Sounds] : lungs were clear to auscultation bilaterally [Normal Rate] : normal [Rhythm Regular] : regular [Normal S2] : normal S2 [Normal S1] : normal S1 [I] : a grade 1 [Abdomen Soft] : soft [Abdomen Tenderness] : non-tender [Abnormal Walk] : normal gait [Nail Clubbing] : no clubbing of the fingernails [Cyanosis, Localized] : no localized cyanosis [Skin Color & Pigmentation] : normal skin color and pigmentation [Affect] : the affect was normal [Oriented To Time, Place, And Person] : oriented to person, place, and time [S3] : no S3 [S4] : no S4 [FreeTextEntry1] : R groin with ecchymosis and small hematoma noted

## 2020-02-26 ENCOUNTER — APPOINTMENT (OUTPATIENT)
Dept: CARDIOLOGY | Facility: CLINIC | Age: 60
End: 2020-02-26

## 2020-02-28 ENCOUNTER — APPOINTMENT (OUTPATIENT)
Dept: CARDIOLOGY | Facility: CLINIC | Age: 60
End: 2020-02-28

## 2020-03-30 ENCOUNTER — APPOINTMENT (OUTPATIENT)
Dept: CARDIOLOGY | Facility: CLINIC | Age: 60
End: 2020-03-30

## 2020-03-31 ENCOUNTER — APPOINTMENT (OUTPATIENT)
Dept: CARDIOLOGY | Facility: CLINIC | Age: 60
End: 2020-03-31

## 2020-04-01 ENCOUNTER — APPOINTMENT (OUTPATIENT)
Dept: CARDIOLOGY | Facility: CLINIC | Age: 60
End: 2020-04-01

## 2020-05-04 ENCOUNTER — RX RENEWAL (OUTPATIENT)
Age: 60
End: 2020-05-04

## 2020-06-24 ENCOUNTER — APPOINTMENT (OUTPATIENT)
Dept: UROLOGY | Facility: CLINIC | Age: 60
End: 2020-06-24

## 2020-06-26 ENCOUNTER — APPOINTMENT (OUTPATIENT)
Dept: UROLOGY | Facility: CLINIC | Age: 60
End: 2020-06-26
Payer: COMMERCIAL

## 2020-06-26 PROCEDURE — 99214 OFFICE O/P EST MOD 30 MIN: CPT | Mod: 95

## 2020-06-26 NOTE — HISTORY OF PRESENT ILLNESS
[Home] : at home, [unfilled] , at the time of the visit. [Other Location: e.g. Home (Enter Location, City,State)___] : at [unfilled] [Verbal consent obtained from patient] : the patient, [unfilled] [FreeTextEntry1] : Pt.is asymptomatic at present.\par Last US showed bilateral 2-3 mm stones. \par LL Very low volume. \par I have been nagging her for years to increase her urinary output .....she \par promises me she will make an effort.\par Schedule US in August

## 2020-08-10 ENCOUNTER — APPOINTMENT (OUTPATIENT)
Dept: CARDIOLOGY | Facility: CLINIC | Age: 60
End: 2020-08-10
Payer: COMMERCIAL

## 2020-08-10 ENCOUNTER — NON-APPOINTMENT (OUTPATIENT)
Age: 60
End: 2020-08-10

## 2020-08-10 VITALS
TEMPERATURE: 97.6 F | HEIGHT: 59 IN | SYSTOLIC BLOOD PRESSURE: 99 MMHG | HEART RATE: 71 BPM | BODY MASS INDEX: 36.29 KG/M2 | RESPIRATION RATE: 16 BRPM | DIASTOLIC BLOOD PRESSURE: 63 MMHG | OXYGEN SATURATION: 98 % | WEIGHT: 180 LBS

## 2020-08-10 PROCEDURE — 93000 ELECTROCARDIOGRAM COMPLETE: CPT

## 2020-08-10 PROCEDURE — 99214 OFFICE O/P EST MOD 30 MIN: CPT

## 2020-08-10 NOTE — HISTORY OF PRESENT ILLNESS
[FreeTextEntry1] : Patient is back to the office today with a few complaints. Recently she's been having a lot of issues with dizziness at times associated with palpitations and sweating. This seems to occur often when she is outdoors and at random times. She did restart her hydrochlorothiazide at the end of June. She admits to not drinking very much water at all. She is still having intermittent chest tightness also occurring at random. She has some inspiratory chest pain as well. She reports some shortness of breath in the recent warm weather as well. Overall this has not changed much. She follow up with pulmonary and was told to take Symbicort but she has not been doing so. Patient denies palpitations, orthopnea, syncope.

## 2020-08-10 NOTE — PHYSICAL EXAM
[General Appearance - In No Acute Distress] : no acute distress [Normal Conjunctiva] : the conjunctiva exhibited no abnormalities [Normal Oral Mucosa] : normal oral mucosa [Auscultation Breath Sounds / Voice Sounds] : lungs were clear to auscultation bilaterally [Normal Rate] : normal [Normal S1] : normal S1 [Rhythm Regular] : regular [Normal S2] : normal S2 [Abdomen Soft] : soft [I] : a grade 1 [Abnormal Walk] : normal gait [Abdomen Tenderness] : non-tender [Cyanosis, Localized] : no localized cyanosis [Nail Clubbing] : no clubbing of the fingernails [Skin Color & Pigmentation] : normal skin color and pigmentation [Oriented To Time, Place, And Person] : oriented to person, place, and time [Affect] : the affect was normal [FreeTextEntry1] : No JVD, no carotid bruits. [S3] : no S3 [S4] : no S4

## 2020-08-10 NOTE — ASSESSMENT
[FreeTextEntry1] : EKG: Sinus rhythm with no significant ST or T wave changes.\par \par 59-year-old female with a past medical history of CAD s/p MI and stents pericarditis, asthma and sarcoidosis who presents for followup. The patient continues to have a lot of issues with chest pain at times and shortness of breath. She never had stress testing done because of the pandemic. She is willing to have that done at this time. She also is not taking Symbicort as was recommended by her pulmonologist and I have advised her to do so as this should help with her shortness of breath and possibly her chest pain. Chest pain still appears to be more likely musculoskeletal or possibly costochondritis. She is having some dizziness recently probably related to low blood pressures and dehydration in the setting of taking hydrochlorothiazide. She does note she had some edema which has improved so I will not discontinue the medication but again encouraged her strongly on the need to increase her hydration.

## 2020-08-10 NOTE — DISCUSSION/SUMMARY
[FreeTextEntry1] : 1. Continue current cardiac meds in doses as noted above for CAD.\par 2. Check nuclear stress test to further evaluate her chest pain and dyspnea on exertion.\par 3. Monitor BP at home, keep a log and bring to f/u. \par 4. Follow up with pulmonary and consider sleep study. Advise her to use her Symbicort as was ordered by her pulmonologist.\par 5. Patient encouraged to work on diet to lose weight.\par 6. Strongly encourage her to significantly increase her fluid intake.\par 7. Repeat blood work at this time.\par 8. Follow up here in two months.

## 2020-08-30 ENCOUNTER — APPOINTMENT (OUTPATIENT)
Dept: DISASTER EMERGENCY | Facility: CLINIC | Age: 60
End: 2020-08-30

## 2020-08-30 LAB — SARS-COV-2 N GENE NPH QL NAA+PROBE: NOT DETECTED

## 2020-09-04 ENCOUNTER — OUTPATIENT (OUTPATIENT)
Dept: OUTPATIENT SERVICES | Facility: HOSPITAL | Age: 60
LOS: 1 days | End: 2020-09-04
Payer: COMMERCIAL

## 2020-09-04 DIAGNOSIS — G47.30 SLEEP APNEA, UNSPECIFIED: ICD-10-CM

## 2020-09-04 PROCEDURE — 95810 POLYSOM 6/> YRS 4/> PARAM: CPT

## 2020-09-04 PROCEDURE — 95810 POLYSOM 6/> YRS 4/> PARAM: CPT | Mod: 26

## 2020-09-23 ENCOUNTER — NON-APPOINTMENT (OUTPATIENT)
Age: 60
End: 2020-09-23

## 2020-09-25 ENCOUNTER — APPOINTMENT (OUTPATIENT)
Dept: CARDIOLOGY | Facility: CLINIC | Age: 60
End: 2020-09-25
Payer: COMMERCIAL

## 2020-09-25 PROCEDURE — 78452 HT MUSCLE IMAGE SPECT MULT: CPT

## 2020-09-25 PROCEDURE — A9500: CPT

## 2020-09-25 PROCEDURE — 93015 CV STRESS TEST SUPVJ I&R: CPT

## 2020-09-25 RX ORDER — AMINOPHYLLINE 25 MG/ML
25 INJECTION, SOLUTION INTRAVENOUS
Qty: 0 | Refills: 0 | Status: COMPLETED | OUTPATIENT
Start: 2020-09-25

## 2020-09-25 RX ORDER — REGADENOSON 0.08 MG/ML
0.4 INJECTION, SOLUTION INTRAVENOUS
Qty: 4 | Refills: 0 | Status: COMPLETED | OUTPATIENT
Start: 2020-09-25

## 2020-09-25 RX ADMIN — REGADENOSON 0 MG/5ML: 0.08 INJECTION, SOLUTION INTRAVENOUS at 00:00

## 2020-09-25 RX ADMIN — AMINOPHYLLINE 0 MG/ML: 25 INJECTION, SOLUTION INTRAVENOUS at 00:00

## 2020-09-27 ENCOUNTER — APPOINTMENT (OUTPATIENT)
Dept: DISASTER EMERGENCY | Facility: CLINIC | Age: 60
End: 2020-09-27

## 2020-09-28 LAB — SARS-COV-2 N GENE NPH QL NAA+PROBE: NOT DETECTED

## 2020-09-29 ENCOUNTER — APPOINTMENT (OUTPATIENT)
Dept: CARDIOLOGY | Facility: CLINIC | Age: 60
End: 2020-09-29

## 2020-10-02 ENCOUNTER — OUTPATIENT (OUTPATIENT)
Dept: OUTPATIENT SERVICES | Facility: HOSPITAL | Age: 60
LOS: 1 days | End: 2020-10-02
Payer: COMMERCIAL

## 2020-10-02 DIAGNOSIS — G47.33 OBSTRUCTIVE SLEEP APNEA (ADULT) (PEDIATRIC): ICD-10-CM

## 2020-10-02 PROCEDURE — 95811 POLYSOM 6/>YRS CPAP 4/> PARM: CPT

## 2020-10-02 PROCEDURE — 95811 POLYSOM 6/>YRS CPAP 4/> PARM: CPT | Mod: 26

## 2020-10-08 RX ORDER — KIT FOR THE PREPARATION OF TECHNETIUM TC99M SESTAMIBI 1 MG/5ML
INJECTION, POWDER, LYOPHILIZED, FOR SOLUTION PARENTERAL
Refills: 0 | Status: COMPLETED | OUTPATIENT
Start: 2020-10-08

## 2020-10-08 RX ADMIN — KIT FOR THE PREPARATION OF TECHNETIUM TC99M SESTAMIBI 0: 1 INJECTION, POWDER, LYOPHILIZED, FOR SOLUTION PARENTERAL at 00:00

## 2020-10-28 ENCOUNTER — APPOINTMENT (OUTPATIENT)
Dept: OBGYN | Facility: CLINIC | Age: 60
End: 2020-10-28
Payer: COMMERCIAL

## 2020-10-28 VITALS
HEIGHT: 59 IN | WEIGHT: 180 LBS | BODY MASS INDEX: 36.29 KG/M2 | SYSTOLIC BLOOD PRESSURE: 106 MMHG | DIASTOLIC BLOOD PRESSURE: 73 MMHG

## 2020-10-28 PROCEDURE — 99072 ADDL SUPL MATRL&STAF TM PHE: CPT

## 2020-10-28 PROCEDURE — 99396 PREV VISIT EST AGE 40-64: CPT

## 2020-10-28 NOTE — HISTORY OF PRESENT ILLNESS
[FreeTextEntry1] : pt is a 60 y/o p3 presents for annual gyn visit  [No] : Patient does not have concerns regarding sex

## 2020-10-30 LAB — HPV HIGH+LOW RISK DNA PNL CVX: NOT DETECTED

## 2020-11-02 LAB — CYTOLOGY CVX/VAG DOC THIN PREP: NORMAL

## 2020-11-16 ENCOUNTER — NON-APPOINTMENT (OUTPATIENT)
Age: 60
End: 2020-11-16

## 2020-11-16 ENCOUNTER — APPOINTMENT (OUTPATIENT)
Dept: CARDIOLOGY | Facility: CLINIC | Age: 60
End: 2020-11-16
Payer: COMMERCIAL

## 2020-11-16 VITALS
HEIGHT: 59 IN | SYSTOLIC BLOOD PRESSURE: 116 MMHG | TEMPERATURE: 97.7 F | BODY MASS INDEX: 37.09 KG/M2 | DIASTOLIC BLOOD PRESSURE: 80 MMHG | WEIGHT: 184 LBS | HEART RATE: 70 BPM | RESPIRATION RATE: 16 BRPM

## 2020-11-16 DIAGNOSIS — Z01.419 ENCOUNTER FOR GYNECOLOGICAL EXAMINATION (GENERAL) (ROUTINE) W/OUT ABNORMAL FINDINGS: ICD-10-CM

## 2020-11-16 DIAGNOSIS — Z01.818 ENCOUNTER FOR OTHER PREPROCEDURAL EXAMINATION: ICD-10-CM

## 2020-11-16 PROCEDURE — 99072 ADDL SUPL MATRL&STAF TM PHE: CPT

## 2020-11-16 PROCEDURE — 93000 ELECTROCARDIOGRAM COMPLETE: CPT

## 2020-11-16 PROCEDURE — 99214 OFFICE O/P EST MOD 30 MIN: CPT

## 2020-11-16 RX ORDER — HYDROCHLOROTHIAZIDE 25 MG/1
25 TABLET ORAL
Qty: 90 | Refills: 1 | Status: DISCONTINUED | COMMUNITY
Start: 2020-08-10 | End: 2020-11-16

## 2020-11-16 RX ORDER — ASPIRIN ENTERIC COATED TABLETS 81 MG 81 MG/1
81 TABLET, DELAYED RELEASE ORAL
Qty: 90 | Refills: 1 | Status: DISCONTINUED | COMMUNITY
Start: 2019-11-01 | End: 2020-11-16

## 2020-11-16 NOTE — DISCUSSION/SUMMARY
[FreeTextEntry1] : 1. Continue current cardiac meds in doses as noted above for CAD.\par 2. No additional cardiac testing at this time.\par 3. Monitor BP at home, keep a log and bring to f/u. \par 4. Follow up with pulmonary for initiation of CPAP.\par 5. Patient encouraged to work on diet to lose weight.\par 6. Patient is encouraged to exercise at least 30 minutes a day everyday of the week.\par 6. Strongly encourage her to significantly increase her fluid intake.\par 7. Follow up here in 3 months.

## 2020-11-16 NOTE — HISTORY OF PRESENT ILLNESS
[FreeTextEntry1] : Patient presents back to the office today having stopped her aspirin because she was having a lot of bruising.  After stopping the aspirin the bruising improved.  She also stopped her hydrochlorothiazide because she was having dizziness and admittedly was not drinking enough water.  She also complains of some fatigue issues.  She does not report any other new complaints at this time.  She did follow-up for a sleep study which showed moderate sleep apnea and had a titration study but has not yet started using a CPAP machine.  She admits that she needs to do a lot more effort with diet but is trying to do a little bit of exercise and has not had any symptoms in doing so.  Patient denies chest pain, shortness of breath, palpitations, orthopnea, presyncope, syncope.

## 2020-11-16 NOTE — PHYSICAL EXAM
[General Appearance - In No Acute Distress] : no acute distress [Normal Conjunctiva] : the conjunctiva exhibited no abnormalities [Normal Oral Mucosa] : normal oral mucosa [Auscultation Breath Sounds / Voice Sounds] : lungs were clear to auscultation bilaterally [Abdomen Soft] : soft [Abdomen Tenderness] : non-tender [Abnormal Walk] : normal gait [Skin Color & Pigmentation] : normal skin color and pigmentation [Oriented To Time, Place, And Person] : oriented to person, place, and time [Affect] : the affect was normal [FreeTextEntry1] : No JVD, no carotid bruits. [Normal Rate] : normal [Rhythm Regular] : regular [Normal S1] : normal S1 [Normal S2] : normal S2 [S3] : no S3 [S4] : no S4 [I] : a grade 1

## 2020-11-16 NOTE — ASSESSMENT
[FreeTextEntry1] : Nuclear stress test September 25, 2020 was a pharmacologic study which was tolerated well.  Blood pressure response was normal.  EKG showed no evidence of ischemia.  Evaluation of nuclear imaging demonstrated no evidence of ischemia or infarct and an ejection fraction of 68%.\par \par EKG: Sinus rhythm with no significant ST or T wave changes.\par \par 59-year-old female with a past medical history of CAD s/p MI and stents pericarditis, asthma and sarcoidosis who presents for followup.  Patient is not having any further issues with chest pain at this time.  Stress testing was unremarkable with no evidence of ischemia and a normal EF.  Her sleep study did show moderate sleep apnea and she needs to follow-up to have initiation of CPAP therapy.  Blood pressure continues to be controlled despite discontinuation of HCTZ which she did on her own.  She needs to follow-up with urology about this being stopped.  She also stopped her aspirin because of bruising.  It has been over a year since her heart attack and at this time would be reasonable to continue on Plavix alone.  She does complain of some fatigue at times and this may be related in part to metoprolol.  However I would first like to see her more active and doing exercise before considering adjusting the medication.

## 2020-11-30 ENCOUNTER — APPOINTMENT (OUTPATIENT)
Dept: PULMONOLOGY | Facility: CLINIC | Age: 60
End: 2020-11-30
Payer: COMMERCIAL

## 2020-11-30 VITALS
DIASTOLIC BLOOD PRESSURE: 84 MMHG | SYSTOLIC BLOOD PRESSURE: 116 MMHG | WEIGHT: 185 LBS | BODY MASS INDEX: 38.83 KG/M2 | HEART RATE: 85 BPM | OXYGEN SATURATION: 96 % | HEIGHT: 58 IN

## 2020-11-30 PROCEDURE — 99204 OFFICE O/P NEW MOD 45 MIN: CPT

## 2020-11-30 NOTE — ASSESSMENT
[FreeTextEntry1] : The patient has moderate sleep apnea, severe in rem sleep. AutoPAP will be ordered for the patient at 6 to 10 cm H2O.  The patient was instructed to begin wearing it with the goal being all night, every night.  Minimum acceptable use parameters were discussed with the patient.  Followup will be in 2-3 months to evaluate compliance and efficacy, and address any problems the patient may have with APAP.\par Patient was instructed to followup at [facility] for followup CT scans.

## 2020-11-30 NOTE — PHYSICAL EXAM
[No Acute Distress] : no acute distress [Normal Oropharynx] : normal oropharynx [III] : Mallampati Class: III [Normal Appearance] : normal appearance [No Neck Mass] : no neck mass [Normal Rate/Rhythm] : normal rate/rhythm [Normal S1, S2] : normal s1, s2 [No Murmurs] : no murmurs [No Resp Distress] : no resp distress [Clear to Auscultation Bilaterally] : clear to auscultation bilaterally [No Abnormalities] : no abnormalities [Benign] : benign [Normal Gait] : normal gait [No Clubbing] : no clubbing [No Cyanosis] : no cyanosis [No Edema] : no edema [FROM] : FROM [Normal Color/ Pigmentation] : normal color/ pigmentation [No Focal Deficits] : no focal deficits [Oriented x3] : oriented x3 [Normal Affect] : normal affect

## 2020-11-30 NOTE — HISTORY OF PRESENT ILLNESS
[Obstructive Sleep Apnea] : obstructive sleep apnea [Lab] : lab [TextBox_4] : The patient is a 60-year-old female who comes for evaluation of her obstructive sleep apnea. She has a history of myocardial infarction about a year ago. She received 2 stents. At the time she was found to have a right upper lobe mass that was 3 cm in diameter. Subsequently a PET/CT scan showed the right upper lobe mass and the right paratracheal region had FDG activity. He underwent bronchoscopy with endobronchial ultrasound and lymph node biopsies were negative. She did not have a biopsy of the mass, so she was on anticoagulation. She was referred to surgery but sought a second opinion at [facility]. A repeat biopsy was scheduled but at the time of biopsy the CT scan showed significant shrinkage of the mass. She is being followed there and will be having a repeat CT scan at some point in the future.\par \par There is a history of mild intermittent asthma on albuterol p.r.n.\par \par The patient was complaining of significant fatigue after her myocardial infarction. She has gained some weight. She is known to snore. Sleep studies were performed. [TextBox_100] : 9/20 [TextBox_108] : 16 [TextBox_112] : 97 [TextBox_116] : 79 [TextBox_120] : AHI 48 in REM.  CPAP therapeutic at 8 [TextBox_104] : 10/20 [TextBox_165] : I reviewed the patient's sleep study with the patient.\par  [ESS] : 6

## 2020-11-30 NOTE — REASON FOR VISIT
[Consultation] : a consultation [Abnormal CXR/ Chest CT] : an abnormal CXR/ chest CT [Sleep Apnea] : sleep apnea

## 2021-01-15 ENCOUNTER — APPOINTMENT (OUTPATIENT)
Dept: CARDIOLOGY | Facility: CLINIC | Age: 61
End: 2021-01-15
Payer: COMMERCIAL

## 2021-01-15 ENCOUNTER — NON-APPOINTMENT (OUTPATIENT)
Age: 61
End: 2021-01-15

## 2021-01-15 VITALS
SYSTOLIC BLOOD PRESSURE: 111 MMHG | OXYGEN SATURATION: 97 % | HEIGHT: 58 IN | DIASTOLIC BLOOD PRESSURE: 77 MMHG | HEART RATE: 67 BPM | RESPIRATION RATE: 16 BRPM | TEMPERATURE: 97.6 F | WEIGHT: 190 LBS | BODY MASS INDEX: 39.88 KG/M2

## 2021-01-15 PROCEDURE — 99214 OFFICE O/P EST MOD 30 MIN: CPT

## 2021-01-15 PROCEDURE — 93000 ELECTROCARDIOGRAM COMPLETE: CPT | Mod: NC

## 2021-01-15 PROCEDURE — 99072 ADDL SUPL MATRL&STAF TM PHE: CPT

## 2021-01-15 NOTE — HISTORY OF PRESENT ILLNESS
[FreeTextEntry1] : Patient presents back today planning to undergo routine colonoscopy as it has been a number of years since this was done.  She has recently been dealing with a lot of pain in her left knee and left leg.  She had a Doppler yesterday which apparently showed no evidence of DVT.  She did have an MRI which also showed significant issues with the meniscus in her knee and she needs to follow-up with orthopedics.  She has gained a little bit of weight recently but started losing after the new year.  She reports no other physical symptoms at this time.  Her activity is limited by her knee but she reports no other exertional complaints.  Patient denies chest pain, shortness of breath, palpitations, orthopnea, presyncope, syncope.

## 2021-01-15 NOTE — DISCUSSION/SUMMARY
[FreeTextEntry1] : 1. Proceed with colonoscopy as planned.\par 2. Monitor through the procedure until stable post procedurally.\par 3. Continue current cardiac meds in doses as noted above for CAD.  She can hold Plavix for 5 days prior to the procedure and restart post procedurally at the discretion of GI.\par 4. No additional cardiac testing at this time.\par 5. Monitor BP at home, keep a log and bring to f/u. \par 6. Follow up with pulmonary for initiation of CPAP.\par 7. Patient encouraged to work on diet to lose weight.\par 8. Patient is encouraged to exercise at least 30 minutes a day everyday of the week.\par 9. Strongly encourage her to significantly increase her fluid intake.\par 10. Follow up here as scheduled.

## 2021-01-15 NOTE — PHYSICAL EXAM
[General Appearance - In No Acute Distress] : no acute distress [Normal Conjunctiva] : the conjunctiva exhibited no abnormalities [Normal Oral Mucosa] : normal oral mucosa [Abdomen Soft] : soft [Auscultation Breath Sounds / Voice Sounds] : lungs were clear to auscultation bilaterally [Abdomen Tenderness] : non-tender [Abnormal Walk] : normal gait [Skin Color & Pigmentation] : normal skin color and pigmentation [Oriented To Time, Place, And Person] : oriented to person, place, and time [Affect] : the affect was normal [Normal Rate] : normal [Rhythm Regular] : regular [Normal S1] : normal S1 [Normal S2] : normal S2 [I] : a grade 1 [FreeTextEntry1] : No JVD, no carotid bruits. [S3] : no S3 [S4] : no S4

## 2021-01-15 NOTE — ASSESSMENT
[FreeTextEntry1] : Nuclear stress test September 25, 2020 was a pharmacologic study which was tolerated well.  Blood pressure response was normal.  EKG showed no evidence of ischemia.  Evaluation of nuclear imaging demonstrated no evidence of ischemia or infarct and an ejection fraction of 68%.\par \par EKG: Sinus rhythm with no significant ST or T wave changes.  Ventricular trigeminy noted.\par \par 60-year-old female with a past medical history of CAD s/p MI and stents pericarditis, asthma and sarcoidosis who presents for followup.  Patient is planning to undergo colonoscopy at this time.  She has been fairly stable from a cardiac standpoint since her stenting and is having no further issues with chest pain or shortness of breath.  She has moderate risk but without cardiac contraindication to colonoscopy at this time.  She is noted to have ventricular trigeminy on EKG today but this is asymptomatic and I do not believe additional work-up is needed for now.  She otherwise appears to be stable.  Blood pressure is adequately controlled.

## 2021-02-23 ENCOUNTER — APPOINTMENT (OUTPATIENT)
Dept: PULMONOLOGY | Facility: CLINIC | Age: 61
End: 2021-02-23
Payer: COMMERCIAL

## 2021-02-23 VITALS
WEIGHT: 190 LBS | OXYGEN SATURATION: 98 % | TEMPERATURE: 97.4 F | SYSTOLIC BLOOD PRESSURE: 112 MMHG | DIASTOLIC BLOOD PRESSURE: 78 MMHG | HEIGHT: 58 IN | RESPIRATION RATE: 16 BRPM | BODY MASS INDEX: 39.88 KG/M2 | HEART RATE: 72 BPM

## 2021-02-23 PROCEDURE — 99214 OFFICE O/P EST MOD 30 MIN: CPT

## 2021-02-23 PROCEDURE — 99072 ADDL SUPL MATRL&STAF TM PHE: CPT

## 2021-02-23 RX ORDER — NITROGLYCERIN 0.4 MG/1
0.4 TABLET SUBLINGUAL
Qty: 90 | Refills: 0 | Status: DISCONTINUED | COMMUNITY
End: 2021-02-23

## 2021-02-23 NOTE — REASON FOR VISIT
[Follow-Up] : a follow-up visit [Abnormal CXR/ Chest CT] : an abnormal CXR/ chest CT [Sleep Apnea] : sleep apnea [Shortness of Breath] : shortness of breath

## 2021-02-23 NOTE — HISTORY OF PRESENT ILLNESS
[TextBox_4] : Patient has not had her followup CT scan yet. She's been complaining of significant dryness from her CPAP. It is precluding full compliance. She is also has baseline shortness of breath. She carries a diagnosis of sarcoidosis and asthma but the diagnosis of sarcoidosis is not clear as her CT scan did not show any hilar or mediastinal adenopathy. [Obstructive Sleep Apnea] : obstructive sleep apnea [Lab] : lab [APAP:] : APAP [TextBox_100] : 9/20 [TextBox_108] : 16 [TextBox_112] : 97 [TextBox_116] : 79 [TextBox_120] : AHI 48 in REM.  CPAP therapeutic at 8 [TextBox_104] : 10/20 [TextBox_125] : 6-10 [TextBox_127] : 1/21 [TextBox_129] : 2/21 [TextBox_133] : 50 [TextBox_137] : 23 [TextBox_141] : 3 [TextBox_143] : 33 [TextBox_147] : 0.6 [TextBox_165] : Significant air leak noted.\par

## 2021-02-23 NOTE — ASSESSMENT
[FreeTextEntry1] : Patient has poor compliance which appears to be secondary to large air leak and probable oral exhalation. I gave her a full face mask to try and I reordered supplies.\par \par Shortness of breath possibly asthma. Pulmonary function studies have been ordered. Also we await the followup CT scan. I doubt the diagnosis of sarcoidosis at this time but will try to get some pathology reports. Followup in 6 weeks.

## 2021-03-03 ENCOUNTER — APPOINTMENT (OUTPATIENT)
Dept: UROLOGY | Facility: CLINIC | Age: 61
End: 2021-03-03

## 2021-03-04 ENCOUNTER — NON-APPOINTMENT (OUTPATIENT)
Age: 61
End: 2021-03-04

## 2021-03-04 ENCOUNTER — APPOINTMENT (OUTPATIENT)
Dept: CARDIOLOGY | Facility: CLINIC | Age: 61
End: 2021-03-04
Payer: COMMERCIAL

## 2021-03-04 VITALS
HEART RATE: 62 BPM | DIASTOLIC BLOOD PRESSURE: 82 MMHG | BODY MASS INDEX: 39.88 KG/M2 | HEIGHT: 58 IN | RESPIRATION RATE: 16 BRPM | TEMPERATURE: 97.8 F | SYSTOLIC BLOOD PRESSURE: 120 MMHG | WEIGHT: 190 LBS

## 2021-03-04 PROCEDURE — 99072 ADDL SUPL MATRL&STAF TM PHE: CPT

## 2021-03-04 PROCEDURE — 99214 OFFICE O/P EST MOD 30 MIN: CPT

## 2021-03-04 PROCEDURE — 93000 ELECTROCARDIOGRAM COMPLETE: CPT

## 2021-03-04 NOTE — DISCUSSION/SUMMARY
[FreeTextEntry1] : 1. Proceed with colonoscopy as planned.\par 2. Monitor through the procedure until stable post procedurally.\par 3. Continue current cardiac meds in doses as noted above for CAD.  She can hold Plavix for 5 days prior to the procedure and restart post procedurally at the discretion of GI.\par 4. No additional cardiac testing at this time.\par 5. Monitor BP at home, keep a log and bring to f/u. \par 6. Follow up with pulmonary for initiation of CPAP.\par 7. Patient encouraged to work on diet to lose weight.\par 8. Patient is encouraged to exercise at least 30 minutes a day everyday of the week.\par 9. Encourage patient to look into a COVID-19 vaccine.\par 10. Follow up here in 3-4 months.

## 2021-03-04 NOTE — ASSESSMENT
[FreeTextEntry1] : Nuclear stress test September 25, 2020 was a pharmacologic study which was tolerated well.  Blood pressure response was normal.  EKG showed no evidence of ischemia.  Evaluation of nuclear imaging demonstrated no evidence of ischemia or infarct and an ejection fraction of 68%.\par \par EKG: Sinus rhythm with no significant ST or T wave changes. \par \par 60-year-old female with a past medical history of CAD s/p MI and stents, pericarditis, asthma and sarcoidosis who presents for followup.  Patient continues to do well from a cardiac standpoint.  No evidence of recurrent ischemia and no evidence of CHF.  She is not having any shortness of breath or chest pain at this time.  Blood pressure is controlled.  Most recent lipid panel was controlled as well.  She is tolerating her medication without a problem.  She is planning a colonoscopy in the near future for which there is no cardiac contraindication.  I have encouraged her to continue work with exercise and diet to lose weight.  I have also encouraged her to get a COVID-19 vaccine.

## 2021-03-04 NOTE — HISTORY OF PRESENT ILLNESS
[FreeTextEntry1] : Patient presents back today having not yet gotten her colonoscopy because of scheduling issues.  It is now planned for the end of April.  She has been going to the gym 3 days a week and feels well and doing it.  Her primary issue now is with her knees and hips for which she has been receiving shots.  She has not had any issues with chest pain or shortness of breath.  She also is trying to lose weight but has not really yet had any success.  Patient denies chest pain, shortness of breath, palpitations, orthopnea, presyncope, syncope.

## 2021-03-04 NOTE — PHYSICAL EXAM
[General Appearance - In No Acute Distress] : no acute distress [Normal Conjunctiva] : the conjunctiva exhibited no abnormalities [Normal Oral Mucosa] : normal oral mucosa [Auscultation Breath Sounds / Voice Sounds] : lungs were clear to auscultation bilaterally [Abdomen Soft] : soft [Abdomen Tenderness] : non-tender [Abnormal Walk] : normal gait [Skin Color & Pigmentation] : normal skin color and pigmentation [Oriented To Time, Place, And Person] : oriented to person, place, and time [Affect] : the affect was normal [Normal Rate] : normal [Rhythm Regular] : regular [Normal S1] : normal S1 [Normal S2] : normal S2 [I] : a grade 1 [FreeTextEntry1] : No JVD, no carotid bruits. [S3] : no S3 [S4] : no S4

## 2021-03-10 ENCOUNTER — RX RENEWAL (OUTPATIENT)
Age: 61
End: 2021-03-10

## 2021-04-03 ENCOUNTER — LABORATORY RESULT (OUTPATIENT)
Age: 61
End: 2021-04-03

## 2021-04-03 ENCOUNTER — APPOINTMENT (OUTPATIENT)
Dept: DISASTER EMERGENCY | Facility: CLINIC | Age: 61
End: 2021-04-03

## 2021-04-06 ENCOUNTER — APPOINTMENT (OUTPATIENT)
Dept: PULMONOLOGY | Facility: CLINIC | Age: 61
End: 2021-04-06
Payer: COMMERCIAL

## 2021-04-06 VITALS — HEART RATE: 82 BPM | SYSTOLIC BLOOD PRESSURE: 120 MMHG | OXYGEN SATURATION: 97 % | DIASTOLIC BLOOD PRESSURE: 79 MMHG

## 2021-04-06 VITALS — BODY MASS INDEX: 38.1 KG/M2 | TEMPERATURE: 98 F | HEIGHT: 59 IN | WEIGHT: 189 LBS

## 2021-04-06 PROCEDURE — 94729 DIFFUSING CAPACITY: CPT

## 2021-04-06 PROCEDURE — 85018 HEMOGLOBIN: CPT | Mod: QW

## 2021-04-06 PROCEDURE — 99072 ADDL SUPL MATRL&STAF TM PHE: CPT

## 2021-04-06 PROCEDURE — 94010 BREATHING CAPACITY TEST: CPT

## 2021-04-06 PROCEDURE — 99214 OFFICE O/P EST MOD 30 MIN: CPT | Mod: 25

## 2021-04-06 PROCEDURE — 94727 GAS DIL/WSHOT DETER LNG VOL: CPT

## 2021-04-06 NOTE — HISTORY OF PRESENT ILLNESS
[TextBox_4] : Patient came in for pulmonary function studies today. [Obstructive Sleep Apnea] : obstructive sleep apnea [Lab] : lab [APAP:] : APAP [TextBox_100] : 9/20 [TextBox_108] : 16 [TextBox_112] : 97 [TextBox_116] : 79 [TextBox_120] : AHI 48 in REM.  CPAP therapeutic at 8 [TextBox_104] : 10/20 [TextBox_125] : 6-10 [TextBox_127] : 3/21 [TextBox_129] : 4/21 [TextBox_133] : 43 [TextBox_137] : 13 [TextBox_141] : 3 [TextBox_143] : 11 [TextBox_147] : 1.1 [TextBox_165] : Significant air leak noted.\par

## 2021-04-06 NOTE — ASSESSMENT
[FreeTextEntry1] : Patient's pulmonary function studies are consistent with obesity. The diagnosis of asthma and sarcoidosis remains in doubt.\par \par Patient's having significant problems with rarely. I tried two other masks to give her today to see if they work. All appear next month to recheck compliance.

## 2021-04-06 NOTE — PROCEDURE
[FreeTextEntry1] : Pulmonary function studies were performed. They show his volume shift consistent with obesity but no evidence for obstruction or diffusing defect.

## 2021-04-07 ENCOUNTER — APPOINTMENT (OUTPATIENT)
Dept: UROLOGY | Facility: CLINIC | Age: 61
End: 2021-04-07
Payer: COMMERCIAL

## 2021-04-07 PROCEDURE — 99202 OFFICE O/P NEW SF 15 MIN: CPT | Mod: 95

## 2021-04-07 NOTE — ASSESSMENT
[FreeTextEntry1] : This is a 60 year-old woman with bilateral renal stones, 2-3 mm in size.\par Patient has recently been diagnosed with lung cancer. She is undergoing treatment for this. In addition, she has sarcoidosis.\par \par Given that this was a Telehealth visit, I was unable to physically examine the patient, her physical examination was deferred.\par \par I recommended she obtain a 24-Hour urinalysis for a kidney stone risk assessment panel. I also recommended she perform a food diary for the days around when she performs the LL.\par \par I encouraged hydration. I recommended she follow-up in 3 weeks for a renal ultrasound to evaluate her stones.\par \par I have spent 25 minutes of time on the encounter, including time spent reviewing and entering data.\par

## 2021-04-07 NOTE — ADDENDUM
[FreeTextEntry1] : Entered by Parker Pina, acting as scribe for Dr. Carrion.\par \par The documentation recorded by the scribe accurately reflects the service I personally performed and the decisions made by me.\par

## 2021-04-07 NOTE — HISTORY OF PRESENT ILLNESS
[Other Location: e.g. School (Enter Location, City,State)___] : at [unfilled], at the time of the visit. [FreeTextEntry1] : The patient's identity has been confirmed.  The patient was previously emailed a copy of the telemedicine consent.  They have had a chance to review and has now given verbal consent and has requested care to be assessed and treated through telemedicine and understands there maybe limitations in this process and they may need further follow up care in the office and or hospital settings.\par \par Patient requested telehealth visit. Verbal consent was obtained. Patient was at home and consulted over TeleHealth visit. I was in the office in [place].\par \par This is a 60 year-old woman with bilateral renal stones, 2-3 mm in size.\par Patient has recently been diagnosed with lung cancer. She is undergoing treatment for this. In addition, she has sarcoidosis.\par \par Given that this was a Telehealth visit, I was unable to physically examine the patient, her physical examination was deferred.\par \par I recommended she obtain a 24-Hour urinalysis for a kidney stone risk assessment panel. I also recommended she perform a food diary for the days around when she performs the LL.\par \par I encouraged hydration. I recommended she follow-up in 3 weeks for a renal ultrasound to evaluate her stones.\par \par

## 2021-05-11 ENCOUNTER — APPOINTMENT (OUTPATIENT)
Dept: UROLOGY | Facility: CLINIC | Age: 61
End: 2021-05-11
Payer: COMMERCIAL

## 2021-05-11 ENCOUNTER — OUTPATIENT (OUTPATIENT)
Dept: OUTPATIENT SERVICES | Facility: HOSPITAL | Age: 61
LOS: 1 days | End: 2021-05-11
Payer: COMMERCIAL

## 2021-05-11 DIAGNOSIS — R35.0 FREQUENCY OF MICTURITION: ICD-10-CM

## 2021-05-11 DIAGNOSIS — Z87.442 PERSONAL HISTORY OF URINARY CALCULI: ICD-10-CM

## 2021-05-11 PROCEDURE — 76775 US EXAM ABDO BACK WALL LIM: CPT | Mod: 26

## 2021-05-11 PROCEDURE — 76775 US EXAM ABDO BACK WALL LIM: CPT

## 2021-05-11 NOTE — ASSESSMENT
[FreeTextEntry1] : The patient is a 60 year old female presenting today for a renal US for a history of kidney stones. \par She has a history of lung cancer.\par \par The renal US from 5/11/21 demonstrated: \par Right Kidney:10.0 x 4.0 x 4.1 cm \par Cortical Thickness:1.2 cm UP 1.2 cm LP \par \par Left Kidney:9.1 x 4.9 x 4.0 cm \par Cortical thickness: 1.3 cm UP 1.4 cm LP \par \par Bladder: \par \par Impression: Again visualized bilateral nonobstructing stones , in the right kidney lower pole 3.8 mm and in the left kidney lower pole 3.0 mm and in the upper pole 3.2 mm. There is a 5.6 mm right mid pole parenchymal calcification. There is no hydronephrosis or solid masses visualized. \par \par Her Litholink from 4/19/21 showed low urine output, but otherwise normal results\par \par I advised her to drink plenty of fluids and she should be able to pass the stones. \par \par She will complete a Litholink in 5 months time.\par She will have another renal US in 6 months.\par \par She will return to the office in 6 months.\par \par I spent 25 minutes with the patient.

## 2021-05-11 NOTE — PHYSICAL EXAM
[General Appearance - Well Developed] : well developed [General Appearance - Well Nourished] : well nourished [Bowel Sounds] : normal bowel sounds [Abdomen Soft] : soft [Skin Color & Pigmentation] : normal skin color and pigmentation [Heart Rate And Rhythm] : Heart rate and rhythm were normal [] : no respiratory distress [FreeTextEntry1] : In March had 2tones. \par LL. LOW OUTPUT and high salt. \par Repeat in 3 months and see in 4.

## 2021-05-11 NOTE — HISTORY OF PRESENT ILLNESS
[FreeTextEntry1] : The patient is a 60 year old female presenting today for a renal US for a history of kidney stones. \par She has a history of lung cancer.\par \par The renal US from 5/11/21 demonstrated: \par Right Kidney:10.0 x 4.0 x 4.1 cm \par Cortical Thickness:1.2 cm UP 1.2 cm LP \par \par Left Kidney:9.1 x 4.9 x 4.0 cm \par Cortical thickness: 1.3 cm UP 1.4 cm LP \par \par Bladder: \par \par Impression: Again visualized bilateral nonobstructing stones , in the right kidney lower pole 3.8 mm and in the left kidney lower pole 3.0 mm and in the upper pole 3.2 mm. There is a 5.6 mm right mid pole parenchymal calcification. There is no hydronephrosis or solid masses visualized. \par \par Her Litholink from 4/19/21 showed low urine output, but otherwise normal results\par \par I advised her to drink plenty of fluids and she should be able to pass the stones. \par \par She will complete a Litholink in 5 months time.\par She will have another renal US in 6 months.\par \par She will return to the office in 6 months.

## 2021-05-12 ENCOUNTER — APPOINTMENT (OUTPATIENT)
Dept: PULMONOLOGY | Facility: CLINIC | Age: 61
End: 2021-05-12
Payer: COMMERCIAL

## 2021-05-12 VITALS
BODY MASS INDEX: 39.18 KG/M2 | HEART RATE: 88 BPM | DIASTOLIC BLOOD PRESSURE: 60 MMHG | WEIGHT: 194 LBS | OXYGEN SATURATION: 95 % | SYSTOLIC BLOOD PRESSURE: 130 MMHG

## 2021-05-12 DIAGNOSIS — R91.8 OTHER NONSPECIFIC ABNORMAL FINDING OF LUNG FIELD: ICD-10-CM

## 2021-05-12 PROCEDURE — 99072 ADDL SUPL MATRL&STAF TM PHE: CPT

## 2021-05-12 PROCEDURE — 99214 OFFICE O/P EST MOD 30 MIN: CPT

## 2021-05-12 NOTE — REASON FOR VISIT
Slab Off:No [Follow-Up] : a follow-up visit [Abnormal CXR/ Chest CT] : an abnormal CXR/ chest CT [Sleep Apnea] : sleep apnea

## 2021-05-12 NOTE — CONSULT LETTER
[Dear  ___] : Dear  [unfilled], [Courtesy Letter:] : I had the pleasure of seeing your patient, [unfilled], in my office today. [Please see my note below.] : Please see my note below. [Consult Closing:] : Thank you very much for allowing me to participate in the care of this patient.  If you have any questions, please do not hesitate to contact me. [Sincerely,] : Sincerely, [FreeTextEntry3] : Brook Rene MD FCCP\par D-ABSM\par ABIM board certified in  Pulmonary diseases, Sleep medicine\par Internal medicine\par

## 2021-05-12 NOTE — HISTORY OF PRESENT ILLNESS
[Obstructive Sleep Apnea] : obstructive sleep apnea [Lab] : lab [APAP:] : APAP [TextBox_100] : 9/20 [TextBox_108] : 16 [TextBox_112] : 97 [TextBox_116] : 79 [TextBox_120] : AHI 48 in REM.  CPAP therapeutic at 8 [TextBox_104] : 10/20 [TextBox_125] : 6-10 [TextBox_127] : 4/21 [TextBox_129] : 5/21 [TextBox_133] : 33 [TextBox_137] : 10 [TextBox_141] : 3 [TextBox_143] : 17 [TextBox_147] : 1.3 [TextBox_165] : Cpap was taken away for noncompliance. The patient has been unable to find a mask that fits well. Neither of the 2 that I gave her worked.\par

## 2021-05-12 NOTE — ASSESSMENT
[FreeTextEntry1] : Patient with moderate sleep apnea unable to tolerate CPAP. I am referring her for oral appliance therapy. I will see her back after the appliance is treated for a followup sleep study with the appliance in place.\par \par Patient with lung mass that is likely resolving. She is having a followup CT scan at [facility] in a few months.

## 2021-07-12 ENCOUNTER — NON-APPOINTMENT (OUTPATIENT)
Age: 61
End: 2021-07-12

## 2021-07-12 ENCOUNTER — APPOINTMENT (OUTPATIENT)
Dept: CARDIOLOGY | Facility: CLINIC | Age: 61
End: 2021-07-12
Payer: COMMERCIAL

## 2021-07-12 VITALS
HEART RATE: 76 BPM | HEIGHT: 59 IN | DIASTOLIC BLOOD PRESSURE: 67 MMHG | SYSTOLIC BLOOD PRESSURE: 102 MMHG | RESPIRATION RATE: 16 BRPM | BODY MASS INDEX: 38.3 KG/M2 | WEIGHT: 190 LBS | OXYGEN SATURATION: 97 %

## 2021-07-12 PROCEDURE — 99214 OFFICE O/P EST MOD 30 MIN: CPT

## 2021-07-12 PROCEDURE — 93000 ELECTROCARDIOGRAM COMPLETE: CPT

## 2021-07-12 NOTE — HISTORY OF PRESENT ILLNESS
[FreeTextEntry1] : Patient presents back to the office today feeling physically well in general.  She continues to struggle with weight loss and her weight has not really changed much since last I saw her.  She admits to missing her medication a couple times a week because she goes to work in the morning and forgets to take it.  She is tolerating the medication without a problem when she takes it.  She reports no new symptoms.  Patient denies chest pain, shortness of breath, palpitations, orthopnea, presyncope, syncope.

## 2021-07-12 NOTE — DISCUSSION/SUMMARY
[FreeTextEntry1] : 1. Continue current cardiac meds in doses as noted above for CAD.  \par 2. No additional cardiac testing at this time.\par 3. Monitor BP at home, keep a log and bring to f/u. \par 4. Patient is intolerant to CPAP.  F/u for dental appliance to treat ALLA.\par 5. Patient encouraged to work on diet to lose weight.\par 6. Patient is encouraged to exercise at least 30 minutes a day everyday of the week.\par 7. She will have blood work done. \par 8. Follow up here in 4 months.

## 2021-07-12 NOTE — ASSESSMENT
[FreeTextEntry1] : Nuclear stress test September 25, 2020 was a pharmacologic study which was tolerated well.  Blood pressure response was normal.  EKG showed no evidence of ischemia.  Evaluation of nuclear imaging demonstrated no evidence of ischemia or infarct and an ejection fraction of 68%.\par \par EKG: Sinus rhythm with no significant ST or T wave changes. \par \par 60-year-old female with a past medical history of CAD s/p MI and stents, pericarditis, asthma and sarcoidosis who presents for followup.  Patient continues to do well from a cardiac standpoint.  No evidence of recurrent ischemia and no evidence of CHF.  She is not having any shortness of breath or chest pain at this time.  Blood pressure is controlled.  She is tolerating her medication without a problem.  She does admit to missing it a couple of days a week and I have encouraged her to keep some pills in her place of work so she can take them there if she forgets.  I have encouraged her to take a hard look at her diet to try and work on losing weight and also to exercise.

## 2021-08-24 ENCOUNTER — NON-APPOINTMENT (OUTPATIENT)
Age: 61
End: 2021-08-24

## 2021-09-15 ENCOUNTER — RX RENEWAL (OUTPATIENT)
Age: 61
End: 2021-09-15

## 2021-10-01 RX ORDER — METOPROLOL SUCCINATE 50 MG/1
50 TABLET, EXTENDED RELEASE ORAL DAILY
Qty: 90 | Refills: 1 | Status: DISCONTINUED | COMMUNITY
Start: 2019-11-18 | End: 2021-10-01

## 2021-10-01 RX ORDER — CLOPIDOGREL BISULFATE 75 MG/1
75 TABLET, FILM COATED ORAL
Qty: 90 | Refills: 1 | Status: DISCONTINUED | COMMUNITY
Start: 2020-05-04 | End: 2021-10-01

## 2021-10-04 ENCOUNTER — APPOINTMENT (OUTPATIENT)
Dept: CARDIOLOGY | Facility: CLINIC | Age: 61
End: 2021-10-04
Payer: COMMERCIAL

## 2021-10-04 ENCOUNTER — NON-APPOINTMENT (OUTPATIENT)
Age: 61
End: 2021-10-04

## 2021-10-04 VITALS
BODY MASS INDEX: 36.69 KG/M2 | OXYGEN SATURATION: 96 % | RESPIRATION RATE: 16 BRPM | HEIGHT: 59 IN | SYSTOLIC BLOOD PRESSURE: 116 MMHG | DIASTOLIC BLOOD PRESSURE: 81 MMHG | WEIGHT: 182 LBS | HEART RATE: 85 BPM

## 2021-10-04 DIAGNOSIS — Z82.49 FAMILY HISTORY OF ISCHEMIC HEART DISEASE AND OTHER DISEASES OF THE CIRCULATORY SYSTEM: ICD-10-CM

## 2021-10-04 DIAGNOSIS — N18.9 CHRONIC KIDNEY DISEASE, UNSPECIFIED: ICD-10-CM

## 2021-10-04 PROCEDURE — 93000 ELECTROCARDIOGRAM COMPLETE: CPT

## 2021-10-04 PROCEDURE — 99214 OFFICE O/P EST MOD 30 MIN: CPT

## 2021-10-04 RX ORDER — LISINOPRIL 2.5 MG/1
2.5 TABLET ORAL
Qty: 90 | Refills: 1 | Status: DISCONTINUED | COMMUNITY
End: 2021-10-04

## 2021-10-04 RX ORDER — CARVEDILOL 3.12 MG/1
3.12 TABLET, FILM COATED ORAL
Qty: 180 | Refills: 1 | Status: DISCONTINUED | COMMUNITY
End: 2021-10-04

## 2021-10-04 RX ORDER — ASPIRIN 81 MG/1
81 TABLET, CHEWABLE ORAL DAILY
Qty: 90 | Refills: 1 | Status: ACTIVE | COMMUNITY

## 2021-10-04 RX ORDER — FLUTICASONE PROPIONATE 50 UG/1
50 SPRAY, METERED NASAL DAILY
Refills: 0 | Status: DISCONTINUED | COMMUNITY
End: 2021-10-04

## 2021-10-04 RX ORDER — HYDROCHLOROTHIAZIDE 25 MG/1
25 TABLET ORAL
Qty: 90 | Refills: 0 | Status: DISCONTINUED | COMMUNITY
End: 2021-10-04

## 2021-10-04 NOTE — DISCUSSION/SUMMARY
[FreeTextEntry1] : 1. We will look into getting her started on cardiac rehab.  I have encouraged her to hold off on working for about a month following her MI.\par 2. Change carvedilol back to metoprolol ER 50 mg daily.  Discontinue lisinopril.  She can restart HCTZ at 25 mg every other day.\par 3. She will continue with Brilinta 90 mg twice daily as she was given at the hospital.  I will look into seeing if we can get it to her for a reasonable price.  If not she will go back to Plavix 75 mg daily after she runs out of the Brilinta.  Continue with aspirin 81 mg daily.\par 4. She will continue with atorvastatin at high dose.  Plan repeat lipids and if her LDL is high consider PCSK9 inhibitor.  Strongly encourage compliance.\par 5. No additional cardiac testing at this time.\par 6. Monitor BP at home, keep a log and bring to f/u. \par 7. Patient is intolerant to CPAP.  F/u for dental appliance to treat ALLA.\par 8. Patient encouraged to work on diet to lose weight.\par 9. Follow up here in six weeks.

## 2021-10-04 NOTE — ASSESSMENT
[FreeTextEntry1] : Nuclear stress test September 25, 2020 was a pharmacologic study which was tolerated well.  Blood pressure response was normal.  EKG showed no evidence of ischemia.  Evaluation of nuclear imaging demonstrated no evidence of ischemia or infarct and an ejection fraction of 68%.\par \par EKG: Sinus rhythm with no significant ST or T wave changes. \par \par 60-year-old female with a past medical history of CAD s/p MI and stents, pericarditis, asthma and sarcoidosis who presents for followup.  Patient returns today unfortunately having had an episode of ACS for which she was treated with a drug-eluting stent to the first diagonal.  This appears to be proximal to her prior stent.  She does admit that she has not been taking her statin faithfully prior to this episode but is now taking it every day.  Her medications were adjusted including changing metoprolol to carvedilol and the addition of lisinopril with discontinuation of HCTZ.  She will like to back to metoprolol in the certainly seems reasonable.  I do not have all the records but apparently her EF was normal in the hospital.  She does not need to be on such a low-dose of lisinopril and we will discontinue that altogether.  She can start back on her HCTZ which she was taking for her chronic renal issues.  No evidence of ischemia at this time or any heart failure.

## 2021-10-04 NOTE — HISTORY OF PRESENT ILLNESS
[FreeTextEntry1] : Status post recent MI patient presents back to the office today having been in the hospital with unstable angina/very small non-STEMI.  The patient reports that she had been tired for about a week before this happened but then last Monday she developed severe chest pain and shortness of breath associated with diaphoresis while walking to work.  She went to the ER.  While there she had catheterization and stenting of her first diagonal proximal to the prior stent.  She had a very low troponin elevation and was discharged home.  Since being home she reports feeling episodes of shortness of breath which she relates to anxiety.  She reports some mild chest discomfort which is nondescript and not exertional.  She has not been back to work.  She is taking all the medication as she was discharged on with the exception of lisinopril.  She does admit that she had not been taking her atorvastatin faithfully prior to this episode.  Patient denies palpitations, orthopnea, presyncope, syncope.

## 2021-10-07 ENCOUNTER — APPOINTMENT (OUTPATIENT)
Dept: CARDIOLOGY | Facility: CLINIC | Age: 61
End: 2021-10-07
Payer: COMMERCIAL

## 2021-10-07 PROCEDURE — 93015 CV STRESS TEST SUPVJ I&R: CPT

## 2021-10-14 ENCOUNTER — APPOINTMENT (OUTPATIENT)
Dept: CARDIOLOGY | Facility: CLINIC | Age: 61
End: 2021-10-14

## 2021-10-29 ENCOUNTER — OUTPATIENT (OUTPATIENT)
Dept: OUTPATIENT SERVICES | Facility: HOSPITAL | Age: 61
LOS: 1 days | End: 2021-10-29
Payer: COMMERCIAL

## 2021-10-29 ENCOUNTER — APPOINTMENT (OUTPATIENT)
Dept: UROLOGY | Facility: CLINIC | Age: 61
End: 2021-10-29
Payer: COMMERCIAL

## 2021-10-29 DIAGNOSIS — Z87.442 PERSONAL HISTORY OF URINARY CALCULI: ICD-10-CM

## 2021-10-29 DIAGNOSIS — R35.0 FREQUENCY OF MICTURITION: ICD-10-CM

## 2021-10-29 PROCEDURE — 76775 US EXAM ABDO BACK WALL LIM: CPT | Mod: 26

## 2021-10-29 PROCEDURE — 76775 US EXAM ABDO BACK WALL LIM: CPT

## 2021-10-29 PROCEDURE — 99213 OFFICE O/P EST LOW 20 MIN: CPT

## 2021-10-29 NOTE — HISTORY OF PRESENT ILLNESS
[FreeTextEntry1] : The patient is a 60 year old female presenting today for a follow up visit for kidney stones. \par At the end of September, she had a minor heart attack. \par Subsequently, she had COVID at the beginning of October. \par She has been monitoring her salt intake and is actively trying to lower her A1C. \par Now, she is feeling well. \par \par The Litholink from 4/19/21 showed low urine output. She completed another Litholink in early October, but the results are not available yet. \par \par The renal US from 10/29/2021 demonstrated:\par Right kidney: 9.3 x 4.1 x 3.6 cm \par Cortical Thickness: up 1.1 cm lp 1.3 cm \par \par Left kidney: 9.5 x 4.7 x 5.2 cm\par Cortical Thickness: up 1.2 cm lp 1.1 cm \par \par Echogenicity: Normal\par \par Bladder: Not scanned\par \par Findings: There are bilateral non obstructing punctuate calculi visualized the largest in the right kidney lateral lower 3.7 x 3.5 mm and in the left kidney medial mid 4.7 mm Both kidneys are normal in size and echogenicity without hydronephrosis or solid masses visualized\par \par I encouraged her to continue monitoring her salt intake and hydrating well. \par \par She will complete a LL in 5 months.\par She will have a renal US in 6 months.\par \par The patient will return to the office in 6 months.

## 2021-10-29 NOTE — PHYSICAL EXAM
[General Appearance - Well Developed] : well developed [General Appearance - Well Nourished] : well nourished [Normal Appearance] : normal appearance [Well Groomed] : well groomed [General Appearance - In No Acute Distress] : no acute distress [Abdomen Soft] : soft [Abdomen Tenderness] : non-tender [] : no respiratory distress [Edema] : no peripheral edema [Respiration, Rhythm And Depth] : normal respiratory rhythm and effort [Exaggerated Use Of Accessory Muscles For Inspiration] : no accessory muscle use [Oriented To Time, Place, And Person] : oriented to person, place, and time [Affect] : the affect was normal [Mood] : the mood was normal [Not Anxious] : not anxious [Normal Station and Gait] : the gait and station were normal for the patient's age [No Focal Deficits] : no focal deficits [No Palpable Adenopathy] : no palpable adenopathy

## 2021-10-29 NOTE — ASSESSMENT
[FreeTextEntry1] : The patient is a 60 year old female presenting today for a follow up visit for kidney stones. \par At the end of September, she had a minor heart attack. \par Subsequently, she had COVID at the beginning of October. \par She has been monitoring her salt intake and is actively trying to lower her A1C. \par Now, she is feeling well. \par \par The Litholink from 4/19/21 showed low urine output. She completed another Litholink in early October, but the results are not available yet. \par \par The renal US from 10/29/2021 demonstrated:\par Right kidney: 9.3 x 4.1 x 3.6 cm \par Cortical Thickness: up 1.1 cm lp 1.3 cm \par \par Left kidney: 9.5 x 4.7 x 5.2 cm\par Cortical Thickness: up 1.2 cm lp 1.1 cm \par \par Echogenicity: Normal\par \par Bladder: Not scanned\par \par Findings: There are bilateral non obstructing punctuate calculi visualized the largest in the right kidney lateral lower 3.7 x 3.5 mm and in the left kidney medial mid 4.7 mm Both kidneys are normal in size and echogenicity without hydronephrosis or solid masses visualized\par \par I encouraged her to continue monitoring her salt intake and hydrating well. \par \par She will complete a LL in 5 months.\par She will have a renal US in 6 months.\par \par The patient will return to the office in 6 months. \par \par I spent 15 minutes with the patient.

## 2021-11-16 ENCOUNTER — APPOINTMENT (OUTPATIENT)
Dept: CARDIOLOGY | Facility: CLINIC | Age: 61
End: 2021-11-16
Payer: COMMERCIAL

## 2021-11-16 VITALS
DIASTOLIC BLOOD PRESSURE: 60 MMHG | SYSTOLIC BLOOD PRESSURE: 88 MMHG | BODY MASS INDEX: 36.49 KG/M2 | OXYGEN SATURATION: 96 % | RESPIRATION RATE: 16 BRPM | WEIGHT: 181 LBS | HEART RATE: 72 BPM | HEIGHT: 59 IN

## 2021-11-16 DIAGNOSIS — R07.89 OTHER CHEST PAIN: ICD-10-CM

## 2021-11-16 PROCEDURE — 99214 OFFICE O/P EST MOD 30 MIN: CPT

## 2021-11-16 PROCEDURE — 93000 ELECTROCARDIOGRAM COMPLETE: CPT

## 2021-11-16 NOTE — HISTORY OF PRESENT ILLNESS
[FreeTextEntry1] : Patient presents back today having had COVID-19 test after I last saw her. Her primary symptoms with the infection were of body aches all over. She also felt very tired. That started to get better but over the last 2 days she is starting to feel tired and drained again. She walked upstairs at work and had some chest discomfort and shortness of breath yesterday. She did see her primary doctor last week but did not tell him about any of the symptoms it seemed to develop more or less since she saw him. She admits to not eating very properly because of her concerns regarding sugar and other unhealthy foods. She also admits to not drinking enough water. She has lost about 10 pounds in the last couple of months. Patient denies palpitations, orthopnea, presyncope, syncope.

## 2021-11-16 NOTE — ASSESSMENT
[FreeTextEntry1] : Nuclear stress test September 25, 2020 was a pharmacologic study which was tolerated well.  Blood pressure response was normal.  EKG showed no evidence of ischemia.  Evaluation of nuclear imaging demonstrated no evidence of ischemia or infarct and an ejection fraction of 68%.\par \par 61-year-old female with a past medical history of CAD s/p MI and stents, pericarditis, asthma and sarcoidosis who presents for followup. Patient returns today having had COVID-19 when I last saw her and having recovered from that reasonably well. Over the last several days she has been feeling very tired and weak. Her blood pressure is little low today. She admits to not drinking very much water not eating properly. She also had some chest pain and shortness of breath going up stairs at work yesterday. It is on the possible that she has a different viral infection at this time causing her symptoms. I certainly highly doubt that she has COVID-19 again after just having clear the infection. Angina seems unlikely for her symptoms but I will repeat her stress testing to be sure. Her blood pressure being low today may be related to an infectious cause or possibly just from dehydration.

## 2021-11-16 NOTE — DISCUSSION/SUMMARY
[FreeTextEntry1] : 1. She will cut hydrochlorothiazide to 1/2 pill every other day. I encouraged her to drink significantly more fluid.\par 2. Check nuclear stress test given her chest pain and shortness of breath on exertion to rule out any recurrent ischemia.\par 3. She will have blood work done including metabolic panel and CBC.\par 4. I encouraged to follow-up with her primary to evaluate further for possible other viral illnesses or causes of her symptoms.\par 5. Repeat blood work showed good control of her lipids and she will continue on high-dose atorvastatin.\par 6. Continue other current cardiac meds in doses as noted above for CAD.\par 7. Monitor BP at home, keep a log and bring to f/u. \par 8. Patient is intolerant to CPAP.  F/u for dental appliance to treat ALLA.\par 9. Follow up here in six weeks.

## 2021-11-17 ENCOUNTER — APPOINTMENT (OUTPATIENT)
Dept: UROLOGY | Facility: CLINIC | Age: 61
End: 2021-11-17
Payer: COMMERCIAL

## 2021-11-17 PROCEDURE — 99442: CPT

## 2021-12-29 ENCOUNTER — APPOINTMENT (OUTPATIENT)
Dept: CARDIOLOGY | Facility: CLINIC | Age: 61
End: 2021-12-29

## 2022-02-14 ENCOUNTER — APPOINTMENT (OUTPATIENT)
Dept: OBGYN | Facility: CLINIC | Age: 62
End: 2022-02-14
Payer: COMMERCIAL

## 2022-02-14 VITALS — DIASTOLIC BLOOD PRESSURE: 72 MMHG | BODY MASS INDEX: 36.96 KG/M2 | WEIGHT: 183 LBS | SYSTOLIC BLOOD PRESSURE: 103 MMHG

## 2022-02-14 DIAGNOSIS — R92.2 INCONCLUSIVE MAMMOGRAM: ICD-10-CM

## 2022-02-14 DIAGNOSIS — Z00.00 ENCOUNTER FOR GENERAL ADULT MEDICAL EXAMINATION W/OUT ABNORMAL FINDINGS: ICD-10-CM

## 2022-02-14 PROCEDURE — 99396 PREV VISIT EST AGE 40-64: CPT

## 2022-02-16 ENCOUNTER — APPOINTMENT (OUTPATIENT)
Dept: CARDIOLOGY | Facility: CLINIC | Age: 62
End: 2022-02-16
Payer: COMMERCIAL

## 2022-02-16 LAB — HPV HIGH+LOW RISK DNA PNL CVX: NOT DETECTED

## 2022-02-16 PROCEDURE — 93015 CV STRESS TEST SUPVJ I&R: CPT

## 2022-02-16 PROCEDURE — A9500: CPT

## 2022-02-16 PROCEDURE — 78452 HT MUSCLE IMAGE SPECT MULT: CPT

## 2022-02-16 RX ORDER — REGADENOSON 0.08 MG/ML
0.4 INJECTION, SOLUTION INTRAVENOUS
Qty: 4 | Refills: 0 | Status: COMPLETED | OUTPATIENT
Start: 2022-02-16

## 2022-02-16 RX ADMIN — REGADENOSON 5 MG/5ML: 0.08 INJECTION, SOLUTION INTRAVENOUS at 00:00

## 2022-02-21 LAB — CYTOLOGY CVX/VAG DOC THIN PREP: NORMAL

## 2022-04-05 ENCOUNTER — RX RENEWAL (OUTPATIENT)
Age: 62
End: 2022-04-05

## 2022-04-09 ENCOUNTER — NON-APPOINTMENT (OUTPATIENT)
Age: 62
End: 2022-04-09

## 2022-04-26 ENCOUNTER — APPOINTMENT (OUTPATIENT)
Dept: CARDIOLOGY | Facility: CLINIC | Age: 62
End: 2022-04-26

## 2022-04-26 ENCOUNTER — NON-APPOINTMENT (OUTPATIENT)
Age: 62
End: 2022-04-26

## 2022-04-26 PROBLEM — Z00.00 ENCOUNTER FOR PREVENTIVE HEALTH EXAMINATION: Noted: 2022-04-26

## 2022-05-18 RX ORDER — KIT FOR THE PREPARATION OF TECHNETIUM TC99M SESTAMIBI 1 MG/5ML
INJECTION, POWDER, LYOPHILIZED, FOR SOLUTION PARENTERAL
Refills: 0 | Status: COMPLETED | OUTPATIENT
Start: 2022-05-18

## 2022-05-18 RX ADMIN — KIT FOR THE PREPARATION OF TECHNETIUM TC99M SESTAMIBI 0: 1 INJECTION, POWDER, LYOPHILIZED, FOR SOLUTION PARENTERAL at 00:00

## 2022-06-06 ENCOUNTER — APPOINTMENT (OUTPATIENT)
Dept: UROLOGY | Facility: CLINIC | Age: 62
End: 2022-06-06

## 2022-06-07 ENCOUNTER — APPOINTMENT (OUTPATIENT)
Dept: UROLOGY | Facility: CLINIC | Age: 62
End: 2022-06-07
Payer: COMMERCIAL

## 2022-06-07 ENCOUNTER — OUTPATIENT (OUTPATIENT)
Dept: OUTPATIENT SERVICES | Facility: HOSPITAL | Age: 62
LOS: 1 days | End: 2022-06-07
Payer: COMMERCIAL

## 2022-06-07 DIAGNOSIS — R35.0 FREQUENCY OF MICTURITION: ICD-10-CM

## 2022-06-07 DIAGNOSIS — N20.0 CALCULUS OF KIDNEY: ICD-10-CM

## 2022-06-07 PROCEDURE — 76775 US EXAM ABDO BACK WALL LIM: CPT | Mod: 26

## 2022-06-07 PROCEDURE — 76775 US EXAM ABDO BACK WALL LIM: CPT

## 2022-06-07 PROCEDURE — 99212 OFFICE O/P EST SF 10 MIN: CPT

## 2022-06-07 NOTE — HISTORY OF PRESENT ILLNESS
[FreeTextEntry1] : : 1960 \par Referring Provider: none \par \par HPI: Ms. SHABBIR LEONARDO is a 61 year yo F with a PMHx notable for kidney stones. 60 year old female presenting today for a follow up visit for kidney stones. At the end of 2021, she had a minor heart attack.  Subsequently, she had COVID at the beginning of October. She is doing well today. \par \par The Litholink from  showed low urine output, markedly elevated Johnny and markedly elevated UCa. She completed another Litholink in early October, but the results are not available yet. \par \par The renal US from today demonstrates:  Again visualized bilateral echogenic foci the largest in the right kidney upper pole linear, no posterior shadow slight twinkle artifact likely vascular and a 0.43 cm likely previously visualized non obstructing punctuate calculus. Again visualized a 0.50 cm linear echogenic focus in the left kidney lower pole likely vascular calcification. Both kidneys are normal in size and echogenicity without hydronephrosis or solid masses visualized.\par \par Anticoagulation: Brilinta\par All: NKDA\par Social: 2-3 drinks a week, never smoker\par PMHx: CAD, borderline DM\par FHx: kidney stones, CAD in family, Breast Cancer in mother\par PSHx: stent placed for CAD\par \par Imaging: ULS today reviewed as above\par  [Urinary Incontinence] : no urinary incontinence [Urinary Retention] : no urinary retention [Urinary Urgency] : no urinary urgency [Urinary Frequency] : no urinary frequency

## 2022-08-01 ENCOUNTER — RX RENEWAL (OUTPATIENT)
Age: 62
End: 2022-08-01

## 2022-11-07 ENCOUNTER — RX RENEWAL (OUTPATIENT)
Age: 62
End: 2022-11-07

## 2022-11-09 ENCOUNTER — NON-APPOINTMENT (OUTPATIENT)
Age: 62
End: 2022-11-09

## 2022-11-09 PROBLEM — R92.2 DENSE BREASTS: Status: ACTIVE | Noted: 2022-02-14

## 2022-11-21 ENCOUNTER — NON-APPOINTMENT (OUTPATIENT)
Age: 62
End: 2022-11-21

## 2022-11-22 ENCOUNTER — RX RENEWAL (OUTPATIENT)
Age: 62
End: 2022-11-22

## 2022-11-28 ENCOUNTER — NON-APPOINTMENT (OUTPATIENT)
Age: 62
End: 2022-11-28

## 2022-11-28 ENCOUNTER — APPOINTMENT (OUTPATIENT)
Dept: CARDIOLOGY | Facility: CLINIC | Age: 62
End: 2022-11-28

## 2022-11-28 VITALS
DIASTOLIC BLOOD PRESSURE: 81 MMHG | SYSTOLIC BLOOD PRESSURE: 120 MMHG | HEIGHT: 59 IN | BODY MASS INDEX: 37.29 KG/M2 | HEART RATE: 65 BPM | WEIGHT: 185 LBS | OXYGEN SATURATION: 97 % | RESPIRATION RATE: 16 BRPM

## 2022-11-28 PROCEDURE — 99214 OFFICE O/P EST MOD 30 MIN: CPT | Mod: 25

## 2022-11-28 PROCEDURE — 93000 ELECTROCARDIOGRAM COMPLETE: CPT

## 2022-11-28 NOTE — ASSESSMENT
[FreeTextEntry1] : Nuclear stress test September 25, 2020 was a pharmacologic study which was tolerated well.  Blood pressure response was normal.  EKG showed no evidence of ischemia.  Evaluation of nuclear imaging demonstrated no evidence of ischemia or infarct and an ejection fraction of 68%.\par \par Nuclear stress test February 16, 2022 was a pharmacologic study which was tolerated well.  Blood pressure response to infusion was normal.  EKG showed no evidence of ischemia with infusion.  Evaluation of nuclear imaging showed no evidence of ischemia or infarct and ejection fraction of 57%.\par \par EKG: Sinus rhythm with no significant ST or T wave changes.\par \par 62-year-old female with a past medical history of CAD s/p MI and stents, pericarditis, asthma and sarcoidosis who presents for followup.  Patient returns today having not been seen in the last year but appearing reasonably stable from a cardiovascular standpoint.  Stress testing back in February showed no evidence of ischemia or infarct and a normal ejection fraction.  She did have recent issue with bleeding from her nose.  This has now resolved and she restarted her aspirin and Brilinta yesterday.  At this point it has been a couple of years since her MI and it would be reasonable to lower the dose of Brilinta to 60 mg twice daily especially given her recent bleeding episode.  She will continue all of her other medications.  Blood pressure appears to be controlled.  EKG is unremarkable.

## 2022-11-28 NOTE — DISCUSSION/SUMMARY
[FreeTextEntry1] : 1.  No additional cardiovascular testing at this time.\par 2.  Continue other current cardiac meds in doses as noted above for CAD.\par 3. Monitor BP at home, keep a log and bring to f/u. \par 4.  Patient is intolerant to CPAP.  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  Encourage patient to work on losing weight.\par 6.  Encourage patient to exercise for 30 minutes a day.\par 7.  Follow-up with orthopedics.\par 8.  She will have blood work done.\par 9.  Follow up here in 3 months. [EKG obtained to assist in diagnosis and management of assessed problem(s)] : EKG obtained to assist in diagnosis and management of assessed problem(s)

## 2022-11-28 NOTE — HISTORY OF PRESENT ILLNESS
[FreeTextEntry1] : Patient returns to the office today having not been seen in about a year.  She did eventually have stress testing done but never followed up.  She called the office in April because she was having swelling and pain in her right knee.  She ultimately ended up at her primary's office who apparently did a Doppler which was negative.  She has since been following up with orthopedics and got a gel shot and is looking into additional treatment.  More recently last week she had issues with a very severe nosebleed.  She went to the ER and had it packed.  She ultimately held aspirin and Brilinta and had the packing removed.  She restarted the aspirin and Brilinta yesterday.  She does not report any other physical symptoms at this time.  Patient denies chest pain, shortness of breath, palpitations, orthopnea, presyncope, syncope.

## 2023-01-28 ENCOUNTER — TRANSCRIPTION ENCOUNTER (OUTPATIENT)
Age: 63
End: 2023-01-28

## 2023-02-13 ENCOUNTER — APPOINTMENT (OUTPATIENT)
Dept: CARDIOLOGY | Facility: CLINIC | Age: 63
End: 2023-02-13
Payer: COMMERCIAL

## 2023-02-13 VITALS
WEIGHT: 187 LBS | DIASTOLIC BLOOD PRESSURE: 70 MMHG | SYSTOLIC BLOOD PRESSURE: 108 MMHG | HEART RATE: 73 BPM | BODY MASS INDEX: 37.7 KG/M2 | RESPIRATION RATE: 16 BRPM | OXYGEN SATURATION: 97 % | HEIGHT: 59 IN

## 2023-02-13 PROCEDURE — 93000 ELECTROCARDIOGRAM COMPLETE: CPT

## 2023-02-13 PROCEDURE — 99214 OFFICE O/P EST MOD 30 MIN: CPT | Mod: 25

## 2023-02-13 NOTE — HISTORY OF PRESENT ILLNESS
signed [FreeTextEntry1] : Patient presents back to the office today noting that she does have a lot of aches and pains and wonders if it is related to her statin.  She does have a lot of arthritis as well and is primarily having issues with her right knee.  She has been unable to take NSAIDs because of her medications and her cardiac history.  She does not report any other cardiovascular type symptoms.  Patient denies chest pain, shortness of breath, palpitations, orthopnea, presyncope, syncope.

## 2023-02-13 NOTE — DISCUSSION/SUMMARY
[FreeTextEntry1] : 1.  Check echocardiogram to reevaluate her CAD and carotid Doppler given her history of CAD.\par 2.  Continue other current cardiac meds in doses as noted above for CAD.\par 3.  She will try coenzyme every 10 and if this does not help with her aches and pains we may consider decreasing her statin.\par 4.  Monitor BP at home, keep a log and bring to f/u. \par 5.  Patient is intolerant to CPAP.  Continue use of dental appliance for treatment of ALLA.\par 6.  Encourage patient to work on losing weight.\par 7.  Encourage patient to exercise for 30 minutes a day.\par 8.  Follow-up with orthopedics.\par 9.  Follow up here in 2 months. [EKG obtained to assist in diagnosis and management of assessed problem(s)] : EKG obtained to assist in diagnosis and management of assessed problem(s)

## 2023-02-13 NOTE — ASSESSMENT
[FreeTextEntry1] : Nuclear stress test September 25, 2020 was a pharmacologic study which was tolerated well.  Blood pressure response was normal.  EKG showed no evidence of ischemia.  Evaluation of nuclear imaging demonstrated no evidence of ischemia or infarct and an ejection fraction of 68%.\par \par Nuclear stress test February 16, 2022 was a pharmacologic study which was tolerated well.  Blood pressure response to infusion was normal.  EKG showed no evidence of ischemia with infusion.  Evaluation of nuclear imaging showed no evidence of ischemia or infarct and ejection fraction of 57%.\par \par EKG: Sinus rhythm with no significant ST or T wave changes.\par \par 62-year-old female with a past medical history of CAD s/p MI and stents, pericarditis, asthma and sarcoidosis who presents for followup.  Patient appears to be reasonably stable from a cardiac tamponade time.  No evidence of recurrent ischemia.  Blood pressure appears to be controlled.  EKG is unremarkable.  She asked if the statin she is taking could be causing aches and pains.  Is certainly possible this is related although she certainly has a degree of arthritis.  I have asked her to try coenzyme every 10 to see if this helps.  If it does not we could consider lowering the dose of atorvastatin but given her history I have recommended continuing it.  She also asked about discontinuing aspirin.  I have recommended not doing that for now given her MI but ultimately Brilinta monotherapy could be reasonable.  For now she will continue on lower dose Brilinta and low-dose aspirin.  She has not had any bleeding but does get issues with bruising. Recent blood work shows good control of her lipids and no evidence of diabetes.

## 2023-02-14 ENCOUNTER — APPOINTMENT (OUTPATIENT)
Dept: UROLOGY | Facility: CLINIC | Age: 63
End: 2023-02-14
Payer: COMMERCIAL

## 2023-02-14 PROCEDURE — 99214 OFFICE O/P EST MOD 30 MIN: CPT

## 2023-02-16 ENCOUNTER — APPOINTMENT (OUTPATIENT)
Dept: OBGYN | Facility: CLINIC | Age: 63
End: 2023-02-16
Payer: COMMERCIAL

## 2023-02-16 VITALS
WEIGHT: 183 LBS | SYSTOLIC BLOOD PRESSURE: 102 MMHG | DIASTOLIC BLOOD PRESSURE: 61 MMHG | BODY MASS INDEX: 36.89 KG/M2 | HEIGHT: 59 IN

## 2023-02-16 DIAGNOSIS — Z01.419 ENCOUNTER FOR GYNECOLOGICAL EXAMINATION (GENERAL) (ROUTINE) W/OUT ABNORMAL FINDINGS: ICD-10-CM

## 2023-02-16 DIAGNOSIS — R30.0 DYSURIA: ICD-10-CM

## 2023-02-16 PROCEDURE — 99396 PREV VISIT EST AGE 40-64: CPT

## 2023-02-21 LAB
BACTERIA UR CULT: NORMAL
HPV HIGH+LOW RISK DNA PNL CVX: NOT DETECTED

## 2023-02-22 LAB — CYTOLOGY CVX/VAG DOC THIN PREP: NORMAL

## 2023-03-08 ENCOUNTER — APPOINTMENT (OUTPATIENT)
Dept: CARDIOLOGY | Facility: CLINIC | Age: 63
End: 2023-03-08
Payer: COMMERCIAL

## 2023-03-08 PROCEDURE — 93880 EXTRACRANIAL BILAT STUDY: CPT

## 2023-03-08 PROCEDURE — 93306 TTE W/DOPPLER COMPLETE: CPT

## 2023-03-19 NOTE — HISTORY OF PRESENT ILLNESS
[FreeTextEntry1] : : 1960 \par Referring Provider: none \par \par HPI: Ms. SHABBIR LEONARDO is a 61 year yo F with a PMHx notable for kidney stones. 60 year old female presenting today for a follow up visit for kidney stones. At the end of 2021, she had a minor heart attack.  Subsequently, she had COVID at the beginning of October. She is doing well today. \par \par The Litholink from  showed low urine output, markedly elevated Johnny and markedly elevated UCa. She completed another Litholink in early October, but the results are not available yet. \par \par The renal US from today demonstrates:  Again visualized bilateral echogenic foci the largest in the right kidney upper pole linear, no posterior shadow slight twinkle artifact likely vascular and a 0.43 cm likely previously visualized non obstructing punctuate calculus. Again visualized a 0.50 cm linear echogenic focus in the left kidney lower pole likely vascular calcification. Both kidneys are normal in size and echogenicity without hydronephrosis or solid masses visualized.\par \par Anticoagulation: Brilinta\par All: NKDA\par Social: 2-3 drinks a week, never smoker\par PMHx: CAD, borderline DM\par FHx: kidney stones, CAD in family, Breast Cancer in mother\par PSHx: stent placed for CAD\par \par Imaging: ULS today reviewed as above\par \par :\par Here today with LL today with much better volume, with Uvol 1.97, good Ca with 178. Back on the HCTZ and salt intake and less than <2g. Discussed low salt diet. Has lower SI joint pain. Discussed keeping the HCTZ. \par  [Urinary Incontinence] : no urinary incontinence [Urinary Retention] : no urinary retention [Urinary Urgency] : no urinary urgency [Urinary Frequency] : no urinary frequency

## 2023-03-27 ENCOUNTER — NON-APPOINTMENT (OUTPATIENT)
Age: 63
End: 2023-03-27

## 2023-03-27 ENCOUNTER — APPOINTMENT (OUTPATIENT)
Dept: CARDIOLOGY | Facility: CLINIC | Age: 63
End: 2023-03-27
Payer: COMMERCIAL

## 2023-03-27 VITALS
WEIGHT: 188 LBS | RESPIRATION RATE: 16 BRPM | SYSTOLIC BLOOD PRESSURE: 122 MMHG | DIASTOLIC BLOOD PRESSURE: 80 MMHG | HEART RATE: 77 BPM | HEIGHT: 59 IN | BODY MASS INDEX: 37.9 KG/M2 | OXYGEN SATURATION: 97 %

## 2023-03-27 PROCEDURE — 99214 OFFICE O/P EST MOD 30 MIN: CPT | Mod: 25

## 2023-03-27 PROCEDURE — 93000 ELECTROCARDIOGRAM COMPLETE: CPT | Mod: NC

## 2023-03-27 NOTE — HISTORY OF PRESENT ILLNESS
[FreeTextEntry1] : Patient presents back today planning to procedures in the near future.  One is to have lithotripsy for kidney stones, the other is surgery for a torn meniscus in her right knee.  Symptomatically she is feeling well from a cardiovascular standpoint.  She is limited by her knee and her activities but reports no other real symptoms or limitations in her physical exertion.  She is tolerating her medication without a problem.  No issues with bleeding.  Patient denies chest pain, shortness of breath, palpitations, orthopnea, presyncope, syncope.

## 2023-03-27 NOTE — DISCUSSION/SUMMARY
[EKG obtained to assist in diagnosis and management of assessed problem(s)] : EKG obtained to assist in diagnosis and management of assessed problem(s) [FreeTextEntry1] : 1.  Proceed with lithotripsy and knee surgery as planned.\par 2.  Monitor through the procedure until stable post procedurally.\par 3.  No additional cardiac testing at this time.\par 4.  Continue other current cardiac meds in doses as noted above for CAD.\par 5.  She can try coenzyme Q10 and if this does not help with her aches and pains.\par 6.  Monitor BP at home, keep a log and bring to f/u. \par 7.  Patient is intolerant to CPAP.  Continue use of dental appliance for treatment of ALLA.\par 8.  Encourage patient to work on losing weight.\par 9.  Encourage patient to exercise for 30 minutes a day.\par 10.  Follow up here in 4 months.

## 2023-03-27 NOTE — ASSESSMENT
[FreeTextEntry1] : Nuclear stress test September 25, 2020 was a pharmacologic study which was tolerated well.  Blood pressure response was normal.  EKG showed no evidence of ischemia.  Evaluation of nuclear imaging demonstrated no evidence of ischemia or infarct and an ejection fraction of 68%.\par \par Nuclear stress test February 16, 2022 was a pharmacologic study which was tolerated well.  Blood pressure response to infusion was normal.  EKG showed no evidence of ischemia with infusion.  Evaluation of nuclear imaging showed no evidence of ischemia or infarct and ejection fraction of 57%.\par \par Echocardiogram March 8, 2023 demonstrated left atrial normal size and function with ejection fraction of 60 to 65%.  No significant valvular or structural abnormality.  No evidence of pericardial effusion.\par \par Carotid Doppler March 8, 2023 showed mild plaque with mild stenosis on the left.  No plaque and no stenosis on the right.\par \par EKG 2/13/23: Sinus rhythm with no significant ST or T wave changes.\par \par 62-year-old female with a past medical history of CAD s/p MI and stents, pericarditis, asthma and sarcoidosis who presents for followup.  Patient appears to be reasonably stable from a cardiac standpoint at this time.  No evidence of recurrent ischemia.  Blood pressure appears to be controlled.  EKG done at visit last month is unremarkable.  Patient scintillation is somewhat limited by her knee but she still appears to be able to do above 4 METS of activity without any symptoms or limitations.  No cardiac contraindication to planned procedures.  Echocardiogram continues to show normal EF and no significant valve disease.  Carotid shows only mild disease.

## 2023-04-03 ENCOUNTER — APPOINTMENT (OUTPATIENT)
Dept: UROLOGY | Facility: CLINIC | Age: 63
End: 2023-04-03
Payer: COMMERCIAL

## 2023-04-03 PROCEDURE — 99212 OFFICE O/P EST SF 10 MIN: CPT | Mod: 95

## 2023-04-12 ENCOUNTER — OUTPATIENT (OUTPATIENT)
Dept: OUTPATIENT SERVICES | Facility: HOSPITAL | Age: 63
LOS: 1 days | End: 2023-04-12
Payer: COMMERCIAL

## 2023-04-12 VITALS
RESPIRATION RATE: 18 BRPM | WEIGHT: 184.97 LBS | SYSTOLIC BLOOD PRESSURE: 102 MMHG | TEMPERATURE: 99 F | DIASTOLIC BLOOD PRESSURE: 78 MMHG | HEIGHT: 59 IN | OXYGEN SATURATION: 96 % | HEART RATE: 92 BPM

## 2023-04-12 DIAGNOSIS — N20.0 CALCULUS OF KIDNEY: ICD-10-CM

## 2023-04-12 DIAGNOSIS — Z95.5 PRESENCE OF CORONARY ANGIOPLASTY IMPLANT AND GRAFT: Chronic | ICD-10-CM

## 2023-04-12 DIAGNOSIS — Z90.722 ACQUIRED ABSENCE OF OVARIES, BILATERAL: Chronic | ICD-10-CM

## 2023-04-12 DIAGNOSIS — Z01.818 ENCOUNTER FOR OTHER PREPROCEDURAL EXAMINATION: ICD-10-CM

## 2023-04-12 DIAGNOSIS — Z95.5 PRESENCE OF CORONARY ANGIOPLASTY IMPLANT AND GRAFT: ICD-10-CM

## 2023-04-12 LAB
ANION GAP SERPL CALC-SCNC: 11 MMOL/L — SIGNIFICANT CHANGE UP (ref 5–17)
BUN SERPL-MCNC: 23 MG/DL — SIGNIFICANT CHANGE UP (ref 7–23)
CALCIUM SERPL-MCNC: 9.7 MG/DL — SIGNIFICANT CHANGE UP (ref 8.4–10.5)
CHLORIDE SERPL-SCNC: 105 MMOL/L — SIGNIFICANT CHANGE UP (ref 96–108)
CO2 SERPL-SCNC: 25 MMOL/L — SIGNIFICANT CHANGE UP (ref 22–31)
CREAT SERPL-MCNC: 1.15 MG/DL — SIGNIFICANT CHANGE UP (ref 0.5–1.3)
EGFR: 54 ML/MIN/1.73M2 — LOW
GLUCOSE SERPL-MCNC: 113 MG/DL — HIGH (ref 70–99)
HCT VFR BLD CALC: 38.8 % — SIGNIFICANT CHANGE UP (ref 34.5–45)
HGB BLD-MCNC: 12.3 G/DL — SIGNIFICANT CHANGE UP (ref 11.5–15.5)
MCHC RBC-ENTMCNC: 29.3 PG — SIGNIFICANT CHANGE UP (ref 27–34)
MCHC RBC-ENTMCNC: 31.7 GM/DL — LOW (ref 32–36)
MCV RBC AUTO: 92.4 FL — SIGNIFICANT CHANGE UP (ref 80–100)
NRBC # BLD: 0 /100 WBCS — SIGNIFICANT CHANGE UP (ref 0–0)
PLATELET # BLD AUTO: 265 K/UL — SIGNIFICANT CHANGE UP (ref 150–400)
POTASSIUM SERPL-MCNC: 4 MMOL/L — SIGNIFICANT CHANGE UP (ref 3.5–5.3)
POTASSIUM SERPL-SCNC: 4 MMOL/L — SIGNIFICANT CHANGE UP (ref 3.5–5.3)
RBC # BLD: 4.2 M/UL — SIGNIFICANT CHANGE UP (ref 3.8–5.2)
RBC # FLD: 14.3 % — SIGNIFICANT CHANGE UP (ref 10.3–14.5)
SODIUM SERPL-SCNC: 141 MMOL/L — SIGNIFICANT CHANGE UP (ref 135–145)
WBC # BLD: 6.85 K/UL — SIGNIFICANT CHANGE UP (ref 3.8–10.5)
WBC # FLD AUTO: 6.85 K/UL — SIGNIFICANT CHANGE UP (ref 3.8–10.5)

## 2023-04-12 PROCEDURE — 36415 COLL VENOUS BLD VENIPUNCTURE: CPT

## 2023-04-12 PROCEDURE — 87077 CULTURE AEROBIC IDENTIFY: CPT

## 2023-04-12 PROCEDURE — G0463: CPT

## 2023-04-12 PROCEDURE — 87086 URINE CULTURE/COLONY COUNT: CPT

## 2023-04-12 PROCEDURE — 85027 COMPLETE CBC AUTOMATED: CPT

## 2023-04-12 PROCEDURE — 80048 BASIC METABOLIC PNL TOTAL CA: CPT

## 2023-04-12 PROCEDURE — 87186 SC STD MICRODIL/AGAR DIL: CPT

## 2023-04-12 RX ORDER — SODIUM CHLORIDE 9 MG/ML
3 INJECTION INTRAMUSCULAR; INTRAVENOUS; SUBCUTANEOUS EVERY 8 HOURS
Refills: 0 | Status: DISCONTINUED | OUTPATIENT
Start: 2023-05-03 | End: 2023-05-17

## 2023-04-12 RX ORDER — SODIUM CHLORIDE 9 MG/ML
1000 INJECTION, SOLUTION INTRAVENOUS
Refills: 0 | Status: DISCONTINUED | OUTPATIENT
Start: 2023-05-03 | End: 2023-05-17

## 2023-04-12 NOTE — H&P PST ADULT - ATTENDING COMMENTS
Patient seen and examined plan for cystoscopy, right ureteroscopy, laser lithotripsy and stent placement

## 2023-04-12 NOTE — H&P PST ADULT - HEART RATE (BEATS/MIN)
Pt states that she began having fever and HA since this Am with a slight cough. States fever up to 101 this AM- has not taken anything for fever or pain. 92

## 2023-04-12 NOTE — H&P PST ADULT - HISTORY OF PRESENT ILLNESS
This is a 63 y/o female PMH Jehovah witness,  HLD, hypertension, CAD, S/P PCI stents 2018 and 2021, renal calculi, S/P lithotripsy 2011, now with recurrent renal calculus.  Presents today for cystoscopy, right ureteroscopy, laser lithotripsy and stent placement.    COVID+ 2021 self treated at home This is a 61 y/o female PMH Jehovah witness, HLD, CAD, S/P PCI stents 2018 and 2021, renal calculi, S/P lithotripsy 2011, now with recurrent renal calculus.  Presents today for cystoscopy, right ureteroscopy, laser lithotripsy and stent placement.    COVID+ 2021 self treated at home This is a 61 y/o female PMH Jehovah witness, HLD, CAD, S/P PCI stents 2018 and 2021, renal calculi, S/P lithotripsy 2011, now with recurrent renal calculus.  Presents today for cystoscopy, right ureteroscopy, laser lithotripsy and stent placement.    COVID+ 2021 self treated at home    **Addendum: Urine culture positive for Proteus Mirabilis on 4/12/23 - Results faxed to Dr. Hagan's office and Rosa (Admin Support) notified via telephone at 1130AM on 5/1/23**

## 2023-04-12 NOTE — H&P PST ADULT - NSICDXPASTSURGICALHX_GEN_ALL_CORE_FT
PAST SURGICAL HISTORY:  Breast Cyst resection - 7 yrs ago     History of Cholecystectomy     S/P BSO (bilateral salpingo-oophorectomy)     Stented coronary artery     Ureteral stent left 1/5/11

## 2023-04-15 LAB
-  AMIKACIN: SIGNIFICANT CHANGE UP
-  AMOXICILLIN/CLAVULANIC ACID: SIGNIFICANT CHANGE UP
-  AMPICILLIN/SULBACTAM: SIGNIFICANT CHANGE UP
-  AMPICILLIN: SIGNIFICANT CHANGE UP
-  AZTREONAM: SIGNIFICANT CHANGE UP
-  CEFAZOLIN: SIGNIFICANT CHANGE UP
-  CEFEPIME: SIGNIFICANT CHANGE UP
-  CEFOXITIN: SIGNIFICANT CHANGE UP
-  CEFTRIAXONE: SIGNIFICANT CHANGE UP
-  CEFUROXIME: SIGNIFICANT CHANGE UP
-  CIPROFLOXACIN: SIGNIFICANT CHANGE UP
-  ERTAPENEM: SIGNIFICANT CHANGE UP
-  GENTAMICIN: SIGNIFICANT CHANGE UP
-  LEVOFLOXACIN: SIGNIFICANT CHANGE UP
-  MEROPENEM: SIGNIFICANT CHANGE UP
-  NITROFURANTOIN: SIGNIFICANT CHANGE UP
-  PIPERACILLIN/TAZOBACTAM: SIGNIFICANT CHANGE UP
-  TOBRAMYCIN: SIGNIFICANT CHANGE UP
-  TRIMETHOPRIM/SULFAMETHOXAZOLE: SIGNIFICANT CHANGE UP
CULTURE RESULTS: SIGNIFICANT CHANGE UP
METHOD TYPE: SIGNIFICANT CHANGE UP
ORGANISM # SPEC MICROSCOPIC CNT: SIGNIFICANT CHANGE UP
ORGANISM # SPEC MICROSCOPIC CNT: SIGNIFICANT CHANGE UP
SPECIMEN SOURCE: SIGNIFICANT CHANGE UP

## 2023-05-01 NOTE — HISTORY OF PRESENT ILLNESS
[FreeTextEntry1] : : 1960 \par Referring Provider: none \par \par HPI: Ms. SHABBIR LEONARDO is a 61 year yo F with a PMHx notable for kidney stones. 60 year old female presenting today for a follow up visit for kidney stones. At the end of 2021, she had a minor heart attack.  Subsequently, she had COVID at the beginning of October. She is doing well today. \par \par The Litholink from  showed low urine output, markedly elevated Johnny and markedly elevated UCa. She completed another Litholink in early October, but the results are not available yet. \par \par The renal US from today demonstrates:  Again visualized bilateral echogenic foci the largest in the right kidney upper pole linear, no posterior shadow slight twinkle artifact likely vascular and a 0.43 cm likely previously visualized non obstructing punctuate calculus. Again visualized a 0.50 cm linear echogenic focus in the left kidney lower pole likely vascular calcification. Both kidneys are normal in size and echogenicity without hydronephrosis or solid masses visualized.\par \par Anticoagulation: Brilinta\par All: NKDA\par Social: 2-3 drinks a week, never smoker\par PMHx: CAD, borderline DM\par FHx: kidney stones, CAD in family, Breast Cancer in mother\par PSHx: stent placed for CAD\par \par Imaging: ULS today reviewed as above\par \par :\par Here today with LL today with much better volume, with Uvol 1.97, good Ca with 178. Back on the HCTZ and salt intake and less than <2g. Discussed low salt diet. Has lower SI joint pain. Discussed keeping the HCTZ. \par \par 4/3:\par ZP US renal with 8 mm and 9 mm stone in the upper and mid pole. Imaging personally reviewed. Discussed ureteroscopy and r/b/a. She is undergoing surgery for her knees in May and would like to consider doing ureteroscopy for stone management as definitive therapy. \par  [Urinary Incontinence] : no urinary incontinence [Urinary Retention] : no urinary retention [Urinary Urgency] : no urinary urgency [Urinary Frequency] : no urinary frequency

## 2023-05-01 NOTE — ASSESSMENT
[FreeTextEntry1] : 61 yo F with right sided nonobstructing kidney stones in the upper and lower poles. Here today to discuss surgical management.

## 2023-05-01 NOTE — PHYSICAL EXAM
[General Appearance - Well Developed] : well developed [General Appearance - Well Nourished] : well nourished [Bowel Sounds] : normal bowel sounds [Abdomen Soft] : soft [Skin Color & Pigmentation] : normal skin color and pigmentation [Skin Turgor] : supple [Heart Rate And Rhythm] : Heart rate and rhythm were normal [] : no respiratory distress [Respiration, Rhythm And Depth] : normal respiratory rhythm and effort [Oriented To Time, Place, And Person] : oriented to person, place, and time [Not Anxious] : not anxious [No Focal Deficits] : no focal deficits [FreeTextEntry1] : no flank pain

## 2023-05-02 ENCOUNTER — TRANSCRIPTION ENCOUNTER (OUTPATIENT)
Age: 63
End: 2023-05-02

## 2023-05-03 ENCOUNTER — APPOINTMENT (OUTPATIENT)
Dept: UROLOGY | Facility: HOSPITAL | Age: 63
End: 2023-05-03

## 2023-05-03 ENCOUNTER — TRANSCRIPTION ENCOUNTER (OUTPATIENT)
Age: 63
End: 2023-05-03

## 2023-05-03 ENCOUNTER — OUTPATIENT (OUTPATIENT)
Dept: OUTPATIENT SERVICES | Facility: HOSPITAL | Age: 63
LOS: 1 days | End: 2023-05-03
Payer: COMMERCIAL

## 2023-05-03 VITALS
RESPIRATION RATE: 16 BRPM | HEART RATE: 58 BPM | DIASTOLIC BLOOD PRESSURE: 68 MMHG | SYSTOLIC BLOOD PRESSURE: 127 MMHG | TEMPERATURE: 98 F | OXYGEN SATURATION: 100 %

## 2023-05-03 VITALS
WEIGHT: 184.97 LBS | HEART RATE: 92 BPM | TEMPERATURE: 99 F | OXYGEN SATURATION: 96 % | HEIGHT: 59 IN | DIASTOLIC BLOOD PRESSURE: 78 MMHG | RESPIRATION RATE: 18 BRPM | SYSTOLIC BLOOD PRESSURE: 102 MMHG

## 2023-05-03 DIAGNOSIS — Z95.5 PRESENCE OF CORONARY ANGIOPLASTY IMPLANT AND GRAFT: Chronic | ICD-10-CM

## 2023-05-03 DIAGNOSIS — N20.0 CALCULUS OF KIDNEY: ICD-10-CM

## 2023-05-03 DIAGNOSIS — Z90.722 ACQUIRED ABSENCE OF OVARIES, BILATERAL: Chronic | ICD-10-CM

## 2023-05-03 PROBLEM — E78.5 HYPERLIPIDEMIA, UNSPECIFIED: Chronic | Status: ACTIVE | Noted: 2023-04-12

## 2023-05-03 PROBLEM — I25.10 ATHEROSCLEROTIC HEART DISEASE OF NATIVE CORONARY ARTERY WITHOUT ANGINA PECTORIS: Chronic | Status: ACTIVE | Noted: 2023-04-12

## 2023-05-03 PROCEDURE — 52356 CYSTO/URETERO W/LITHOTRIPSY: CPT | Mod: RT

## 2023-05-03 PROCEDURE — 74420 UROGRAPHY RTRGR +-KUB: CPT | Mod: 26

## 2023-05-03 PROCEDURE — C1758: CPT

## 2023-05-03 PROCEDURE — 76000 FLUOROSCOPY <1 HR PHYS/QHP: CPT

## 2023-05-03 PROCEDURE — C1889: CPT

## 2023-05-03 PROCEDURE — C2625: CPT

## 2023-05-03 PROCEDURE — C1769: CPT

## 2023-05-03 DEVICE — STENT URET 7FR 22CM: Type: IMPLANTABLE DEVICE | Site: RIGHT | Status: FUNCTIONAL

## 2023-05-03 DEVICE — URETERAL CATH OPEN END 5FR 70CM: Type: IMPLANTABLE DEVICE | Site: RIGHT | Status: FUNCTIONAL

## 2023-05-03 DEVICE — IMPLANTABLE DEVICE: Type: IMPLANTABLE DEVICE | Site: RIGHT | Status: FUNCTIONAL

## 2023-05-03 DEVICE — GUIDEWIRE SENSOR DUAL-FLEX NITINOL STRAIGHT .035" X 150CM: Type: IMPLANTABLE DEVICE | Site: RIGHT | Status: FUNCTIONAL

## 2023-05-03 DEVICE — STONE BASKET ZEROTIP NITINOL 4-WIRE 1.9FR 120CM X 12MM: Type: IMPLANTABLE DEVICE | Site: RIGHT | Status: FUNCTIONAL

## 2023-05-03 RX ORDER — ASPIRIN/CALCIUM CARB/MAGNESIUM 324 MG
324 TABLET ORAL ONCE
Refills: 0 | Status: COMPLETED | OUTPATIENT
Start: 2023-05-03 | End: 2023-05-03

## 2023-05-03 RX ORDER — OXYCODONE HYDROCHLORIDE 5 MG/1
5 TABLET ORAL ONCE
Refills: 0 | Status: DISCONTINUED | OUTPATIENT
Start: 2023-05-03 | End: 2023-05-03

## 2023-05-03 RX ORDER — DIPHENHYDRAMINE HCL 50 MG
12.5 CAPSULE ORAL ONCE
Refills: 0 | Status: DISCONTINUED | OUTPATIENT
Start: 2023-05-03 | End: 2023-05-17

## 2023-05-03 RX ORDER — PHENAZOPYRIDINE HCL 100 MG
1 TABLET ORAL
Qty: 9 | Refills: 0
Start: 2023-05-03 | End: 2023-05-05

## 2023-05-03 RX ORDER — TAMSULOSIN HYDROCHLORIDE 0.4 MG/1
1 CAPSULE ORAL
Qty: 14 | Refills: 0
Start: 2023-05-03 | End: 2023-05-16

## 2023-05-03 RX ORDER — ASPIRIN/CALCIUM CARB/MAGNESIUM 324 MG
1 TABLET ORAL
Refills: 0 | DISCHARGE

## 2023-05-03 RX ORDER — CEFAZOLIN SODIUM 1 G
2000 VIAL (EA) INJECTION ONCE
Refills: 0 | Status: COMPLETED | OUTPATIENT
Start: 2023-05-03 | End: 2023-05-03

## 2023-05-03 RX ORDER — ATORVASTATIN CALCIUM 80 MG/1
1 TABLET, FILM COATED ORAL
Refills: 0 | DISCHARGE

## 2023-05-03 RX ORDER — METOPROLOL TARTRATE 50 MG
1 TABLET ORAL
Refills: 0 | DISCHARGE

## 2023-05-03 RX ORDER — HYDROCHLOROTHIAZIDE 25 MG
1 TABLET ORAL
Refills: 0 | DISCHARGE

## 2023-05-03 RX ORDER — ONDANSETRON 8 MG/1
4 TABLET, FILM COATED ORAL ONCE
Refills: 0 | Status: COMPLETED | OUTPATIENT
Start: 2023-05-03 | End: 2023-05-03

## 2023-05-03 RX ORDER — LIDOCAINE HCL 20 MG/ML
0.2 VIAL (ML) INJECTION ONCE
Refills: 0 | Status: COMPLETED | OUTPATIENT
Start: 2023-05-03 | End: 2023-05-03

## 2023-05-03 RX ORDER — TICAGRELOR 90 MG/1
1 TABLET ORAL
Refills: 0 | DISCHARGE

## 2023-05-03 RX ORDER — ACETAMINOPHEN WITH CODEINE 300MG-30MG
1 TABLET ORAL
Qty: 16 | Refills: 0
Start: 2023-05-03 | End: 2023-05-06

## 2023-05-03 RX ORDER — FAMOTIDINE 10 MG/ML
20 INJECTION INTRAVENOUS ONCE
Refills: 0 | Status: DISCONTINUED | OUTPATIENT
Start: 2023-05-03 | End: 2023-05-17

## 2023-05-03 RX ADMIN — ONDANSETRON 4 MILLIGRAM(S): 8 TABLET, FILM COATED ORAL at 16:00

## 2023-05-03 RX ADMIN — SODIUM CHLORIDE 100 MILLILITER(S): 9 INJECTION, SOLUTION INTRAVENOUS at 12:16

## 2023-05-03 RX ADMIN — Medication 324 MILLIGRAM(S): at 13:05

## 2023-05-03 RX ADMIN — SODIUM CHLORIDE 3 MILLILITER(S): 9 INJECTION INTRAMUSCULAR; INTRAVENOUS; SUBCUTANEOUS at 12:16

## 2023-05-03 NOTE — ASU PATIENT PROFILE, ADULT - BRADEN SCORE (IF 18 OR LESS ACTIVATE SKIN INJURY RISK INCREASED GUIDELINE), MLM
[facility]   Physical Therapy Treatment Note     Name: Vicki Durham  Clinic Number: [id number]    Therapy Diagnosis:   Encounter Diagnoses   Name Primary?    Muscle weakness of lower extremity Yes    Impaired gait and mobility     Muscle tightness      Physician: Man Solorzano III, *    Visit Date: 8/17/2022    Physician Orders: PT Eval and Treat - eval and treat with modalities, B knee arthritis, pre-hab, try to improve ROM  Medical Diagnosis from Referral: Arthritis of both knees  Evaluation Date: 7/20/2022  Authorization Period Expiration: 12/31/2022  Plan of Care Expiration: 9/20/22  Progress Note Due: 8/20/22  Visit # / Visits authorized: 1/ 1, 3/40   FOTO: 3/5     Precautions: HTN that is controlled with medication, history of L kidney removal      PTA Visit #: 0/5     Time In: 11:29a  Time Out: 12:13p  Total Billable Time: 44 minutes    SUBJECTIVE     Pt reports: her foot is feeling better. Her back is hurting her a bit which she thinks could be from doing her exercises twice per day. She feels like the knee has gotten better. She can get in and out of the car easier.   She was compliant with home exercise program.  Response to previous treatment: no adverse effects  Functional change: none noted    Pain: 0/10  Location: left knee      OBJECTIVE     Objective Measures updated at progress report unless specified.       Range of Motion (deg):   Knee Right AROM/PROM Left AROM/PROM   Flexion 110 105   Extension 0 0      Lower Extremity Strength  Right LE   Left LE     Knee extension: 5/5 Knee extension: 5/5   Knee flexion: 5/5 Knee flexion: 5/5   Hip flexion: 4+/5 Hip flexion: 4/5   Hip extension:  4+/5 Hip extension: 4+/5   Hip abduction: 4+/5 Hip abduction: 4+/5   Hip adduction: 4+/5 Hip adduction 4+/5   Ankle dorsiflexion: 5/5 Ankle dorsiflexion: 5/5         Treatment     Vicki received the treatments listed below:      therapeutic exercises and objective measures above to  develop strength, endurance, ROM, flexibility, posture and core stabilization for 44 minutes including:  Nustep 5' (subjective taken)  Bridges 3x10   SLR 3x10  SL hip add 3x10  SL clams iso 2x30s  Prone hip ext x 20 ea NP  SL hip abd 3x10   Heel raises 3x10  Standing hip abd 3x10, 3s  Standing hip extension 3x10, 3s  Calf stretch 3x30s   Supine hamstring stretch 3x30s  Triple flexion 2x10, x10 with red theraband   Shuttle NP   - 3x10 DLP 2.5c   - 2x10 SLP 1.5c   - heel raises 2x10 1.5c NP  Step ups Lvl 3 x10 L  Sit to stand with UE support 2x10      Patient Education and Home Exercises     Home Exercises Provided and Patient Education Provided     Education provided:   - HEP    Written Home Exercises Provided: yes. Exercises were reviewed and Vicki was able to demonstrate them prior to the end of the session.  Vicki demonstrated good  understanding of the education provided. See EMR under Patient Instructions for exercises provided during therapy sessions    ASSESSMENT     Pt presents with significant improvements in subjective reports and objective measures as compared to initial evaluation as indicated above. She has made mild improvements in ROM of L knee, but significant improvement in subjective reports and activity tolerance. She improved activity tolerance significantly. Pt with no adverse effects to treatment session today. Pt instructed to modify HEP to 1x/day instead of 2x to improve symptoms in lower back.     Vicki Is progressing well towards her goals.   Patient prognosis is Good.   Patient will benefit from skilled outpatient Physical Therapy to address the deficits stated above and in the chart below, provide patient /family education, and to maximize patientt's level of independence.      Plan of care discussed with patient: Yes  Patient's spiritual, cultural and educational needs considered and patient is agreeable to the plan of care and goals as stated below:      Anticipated Barriers for  therapy: none      Goals:   1. Report decreased L knee pain  <  / =  4/10 at worst to increase tolerance for prolonged standing.  2. Pt will be able to tolerate multi-directional LE strengthening in order to improve ability to perform household chores.  3. Pt will report 50% improvement in ability to walk long distances since start of care to indicate improved functional mobility.   4. Pt will increase B Ely's test to 100 deg to indicate improved hip flexor/quad flexibility and posture.  5. Pt to tolerate HEP to improve ROM and independence with ADL's.     Long Term Goals: 8 weeks in progress  1. Report decreased L knee pain  <  / =  2/10 at worst to increase tolerance for prolonged standing.  2. Pt will be able to perform 2 x 10 multi-directional LE strengthening without fatigue in order to improve ability to perform household chores.  3. Pt will report 80% improvement in ability to walk long distances since start of care to indicate improved functional mobility.   4. Pt will increase B Ely's test to 110 deg to indicate improved hip flexor/quad flexibility and posture.  5. Pt to be Independent with HEP to improve ROM and independence with ADL's.      PLAN     Plan of care Certification: 7/20/2022 to 9/20/22.     Outpatient Physical Therapy 1 times weekly for 8 weeks to include the following interventions: Electrical Stimulation  , Gait Training, Manual Therapy, Moist Heat/ Ice, Neuromuscular Re-ed, Patient Education, Therapeutic Activities, Therapeutic Exercise and dry needling.       Vishal Merritt, PT      22

## 2023-05-03 NOTE — ASU DISCHARGE PLAN (ADULT/PEDIATRIC) - CARE PROVIDER_API CALL
Julian Hagan)  Urology  [address] [place] [address]  Phone: [phone]  Fax: [phone]  Follow Up Time:

## 2023-05-03 NOTE — ASU DISCHARGE PLAN (ADULT/PEDIATRIC) - NURSING INSTRUCTIONS
OK to take Tylenol/Acetaminophen at 8pm  for pain and every 6 hours after as needed. OR OK to take Motrin/Ibuprofen  for pain and every 6 hours after as needed.

## 2023-05-03 NOTE — ASU DISCHARGE PLAN (ADULT/PEDIATRIC) - ASU DC SPECIAL INSTRUCTIONSFT
STENT: You may have an internal stent (a hollow tube that runs from the kidney to your bladder) after your procedure, which helps urine drain from the kidney to your bladder. Some patients experience urinary frequency, burning, or even back pain (especially with urination). These sensations will gradually get better. Increasing your fluid intake can also improve these symptoms. While the stent is in place, your urine may continue to be bloody. This stent is temporary and must be removed by your urologist as an outpatient with in 3 months unless otherwise specified.  GENERAL: It is common to have blood in your urine after your procedure. It may be pink or even red; inform your doctor if you have a significant amount of clot in the urine or if you are unable to void at all. The urine may clear and then become bloody again especially as you are more physically active.  BATHING: You may shower or bathe.  DIET: You may resume your regular diet and regular medication regimen.  PAIN: You may take Tylenol (acetaminophen) 650-975mg and/or Motrin (ibuprofen) 400-600mg, both available over the counter, for pain every 6 hours as needed. Do not exceed 4000mg of Tylenol (acetaminophen) daily. You may alternate these medications such that you take one or the other every 3 hours for around the clock pain coverage. If you have a stent, the following medications may have been sent to your pharmacy for stent related discomfort: Flomax (tamsulosin) 0.4mg at bedtime until stent removed and Pyridium (phenasopyridine) 100mg every 8 hours as needed for kidney/bladder discomfort for max 3 days (Pyridium will make your urine orange). You have also been sent prescription strength tylenol to use in place of regular tylenol for severe pain only.   ANTIBIOTICS: You may be given a prescription for an antibiotic, please take this medication as instructed and be sure to complete the entire course.  STOOL SOFTENERS: Do not allow yourself to become constipated as straining may cause bleeding. Take stool softeners or a laxative (ex. Miralax, Colace, Senokot, ExLax, etc), available over the counter, if needed.  ACTIVITY: No heavy lifting or strenuous exercise until you are evaluated at your post-operative appointment. Otherwise, you may return to your usual level of physical activity.  ANTICOAGULATION: If you are taking any blood thinning medications, please discuss with your urologist prior to restarting these medications unless otherwise specified.  FOLLOW-UP: If you did not already schedule your post-operative appointment, please call your urologist to schedule and follow-up appointment.  CALL YOUR UROLOGIST IF: You have any bleeding that does not stop, inability to void >8 hours, fever over 100.4 F, chills, persistent nausea/vomiting, changes in your incision concerning for infection, or if your pain is not controlled on your discharge pain medications.

## 2023-05-03 NOTE — ASU PATIENT PROFILE, ADULT - FALL HARM RISK - UNIVERSAL INTERVENTIONS
Bed in lowest position, wheels locked, appropriate side rails in place/Call bell, personal items and telephone in reach/Instruct patient to call for assistance before getting out of bed or chair/Non-slip footwear when patient is out of bed/[place] to call system/Physically safe environment - no spills, clutter or unnecessary equipment/Purposeful Proactive Rounding/Room/bathroom lighting operational, light cord in reach

## 2023-05-03 NOTE — BRIEF OPERATIVE NOTE - OPERATION/FINDINGS
Dictation: [id number]   R URS/LL/stent, no stone for analysis
Procedure: right ureteroscopy/laser, right stent   Preop dx: right kidney stone  Postop dx: right kidney stone

## 2023-05-03 NOTE — ASU DISCHARGE PLAN (ADULT/PEDIATRIC) - NS MD DC FALL RISK RISK
For information on Fall & Injury Prevention, visit: [email or web address] OR  [email or web address] OR  https://www.cdc.gov/steadi/patient.html

## 2023-05-11 ENCOUNTER — APPOINTMENT (OUTPATIENT)
Dept: UROLOGY | Facility: CLINIC | Age: 63
End: 2023-05-11
Payer: COMMERCIAL

## 2023-05-11 ENCOUNTER — OUTPATIENT (OUTPATIENT)
Dept: OUTPATIENT SERVICES | Facility: HOSPITAL | Age: 63
LOS: 1 days | End: 2023-05-11
Payer: COMMERCIAL

## 2023-05-11 VITALS
WEIGHT: 180 LBS | BODY MASS INDEX: 36.29 KG/M2 | HEIGHT: 59 IN | HEART RATE: 77 BPM | SYSTOLIC BLOOD PRESSURE: 122 MMHG | DIASTOLIC BLOOD PRESSURE: 76 MMHG

## 2023-05-11 DIAGNOSIS — Z90.722 ACQUIRED ABSENCE OF OVARIES, BILATERAL: Chronic | ICD-10-CM

## 2023-05-11 DIAGNOSIS — Z95.5 PRESENCE OF CORONARY ANGIOPLASTY IMPLANT AND GRAFT: Chronic | ICD-10-CM

## 2023-05-11 DIAGNOSIS — R82.6 ABNORMAL URINE LEVELS OF SUBSTANCES CHIEFLY NONMEDICINAL AS TO SOURCE: ICD-10-CM

## 2023-05-11 PROCEDURE — 76775 US EXAM ABDO BACK WALL LIM: CPT | Mod: 26

## 2023-05-11 PROCEDURE — 99212 OFFICE O/P EST SF 10 MIN: CPT | Mod: 25

## 2023-05-11 PROCEDURE — 76775 US EXAM ABDO BACK WALL LIM: CPT

## 2023-05-11 NOTE — HISTORY OF PRESENT ILLNESS
[FreeTextEntry1] : : 1960 \par Referring Provider: none \par \par HPI: Ms. SHABBIR LEONARDO is a 61 year yo F with a PMHx notable for kidney stones. 60 year old female presenting today for a follow up visit for kidney stones. At the end of 2021, she had a minor heart attack.  Subsequently, she had COVID at the beginning of October. She is doing well today. \par \par The Litholink from  showed low urine output, markedly elevated Johnny and markedly elevated UCa. She completed another Litholink in early October, but the results are not available yet. \par \par The renal US from today demonstrates:  Again visualized bilateral echogenic foci the largest in the right kidney upper pole linear, no posterior shadow slight twinkle artifact likely vascular and a 0.43 cm likely previously visualized non obstructing punctuate calculus. Again visualized a 0.50 cm linear echogenic focus in the left kidney lower pole likely vascular calcification. Both kidneys are normal in size and echogenicity without hydronephrosis or solid masses visualized.\par \par Anticoagulation: Brilinta\par All: NKDA\par Social: 2-3 drinks a week, never smoker\par PMHx: CAD, borderline DM\par FHx: kidney stones, CAD in family, Breast Cancer in mother\par PSHx: stent placed for CAD\par \par Imaging: ULS today reviewed as above\par \par :\par Here today with LL today with much better volume, with Uvol 1.97, good Ca with 178. Back on the HCTZ and salt intake and less than <2g. Discussed low salt diet. Has lower SI joint pain. Discussed keeping the HCTZ. \par \par 4/3:\par ZP US renal with 8 mm and 9 mm stone in the upper and mid pole. Imaging personally reviewed. Discussed ureteroscopy and r/b/a. She is undergoing surgery for her knees in May and would like to consider doing ureteroscopy for stone management as definitive therapy. \par \par :\par HEre today s/p URS, stent removed. Stone type pending. Diet modification reviewed at length- increasing fluids (primarily water), citrus is good, and decreasing/moderating salt, animal flesh protein, oxalate containing foods, and moderation of calcium intake (1000 mg/day is USRDA).\par \par I reviewed with the patient the risks of metabolic stone disease given their underlying risk parameters (all of which include large stones, multiple stones, bilateral stones, family history, and young age), and the indications for 24 hour urine metabolic assessment. We also discussed benefits of regular exercise and weight loss as independent risk reducers for stones.\par \par \par  [Urinary Incontinence] : no urinary incontinence [Urinary Retention] : no urinary retention [Urinary Urgency] : no urinary urgency [Urinary Frequency] : no urinary frequency

## 2023-05-11 NOTE — PHYSICAL EXAM
[General Appearance - Well Developed] : well developed [General Appearance - Well Nourished] : well nourished [Bowel Sounds] : normal bowel sounds [Abdomen Soft] : soft [FreeTextEntry1] : no flank pain [Skin Color & Pigmentation] : normal skin color and pigmentation [Skin Turgor] : supple [Heart Rate And Rhythm] : Heart rate and rhythm were normal [] : no respiratory distress [Respiration, Rhythm And Depth] : normal respiratory rhythm and effort [Oriented To Time, Place, And Person] : oriented to person, place, and time [Not Anxious] : not anxious [No Focal Deficits] : no focal deficits

## 2023-05-12 DIAGNOSIS — R82.6 ABNORMAL URINE LEVELS OF SUBSTANCES CHIEFLY NONMEDICINAL AS TO SOURCE: ICD-10-CM

## 2023-06-21 ENCOUNTER — NON-APPOINTMENT (OUTPATIENT)
Age: 63
End: 2023-06-21

## 2023-06-27 ENCOUNTER — OFFICE (OUTPATIENT)
Dept: URBAN - METROPOLITAN AREA CLINIC 104 | Facility: CLINIC | Age: 63
Setting detail: OPHTHALMOLOGY
End: 2023-06-27
Payer: COMMERCIAL

## 2023-06-27 DIAGNOSIS — H25.13: ICD-10-CM

## 2023-06-27 PROCEDURE — 92014 COMPRE OPH EXAM EST PT 1/>: CPT | Performed by: SPECIALIST

## 2023-06-27 ASSESSMENT — REFRACTION_CURRENTRX
OS_OVR_VA: 20/
OD_AXIS: 18
OD_VPRISM_DIRECTION: PROGS
OD_CYLINDER: -1.00
OD_OVR_VA: 20/
OS_CYLINDER: -1.00
OS_VPRISM_DIRECTION: PROGS
OS_AXIS: 175
OS_SPHERE: -0.50
OS_ADD: +2.00
OD_SPHERE: -0.50
OD_ADD: +2.00

## 2023-06-27 ASSESSMENT — REFRACTION_MANIFEST
OS_CYLINDER: -1.00
OD_AXIS: 20
OD_SPHERE: -0.50
OS_ADD: +2.00
OD_ADD: +2.00
OS_SPHERE: -0.50
OS_VA1: 20/20
OD_VA2: 20/20(J1+)
OD_VA1: 20/20
OS_AXIS: 180
OS_VA2: 20/20(J1+)
OD_CYLINDER: -1.00

## 2023-06-27 ASSESSMENT — VISUAL ACUITY
OS_BCVA: 20/20
OD_BCVA: 20/20

## 2023-06-27 ASSESSMENT — TONOMETRY
OS_IOP_MMHG: 16
OD_IOP_MMHG: 16

## 2023-06-27 ASSESSMENT — REFRACTION_AUTOREFRACTION
OD_AXIS: 150
OD_SPHERE: -1.25
OS_CYLINDER: -1.00
OD_CYLINDER: -0.50
OS_SPHERE: -0.75
OS_AXIS: 180

## 2023-06-27 ASSESSMENT — SPHEQUIV_DERIVED
OD_SPHEQUIV: -1.5
OS_SPHEQUIV: -1
OS_SPHEQUIV: -1.25
OD_SPHEQUIV: -1

## 2023-06-27 ASSESSMENT — CONFRONTATIONAL VISUAL FIELD TEST (CVF)
OD_FINDINGS: FULL
OS_FINDINGS: FULL

## 2023-07-06 ENCOUNTER — APPOINTMENT (OUTPATIENT)
Dept: UROLOGY | Facility: CLINIC | Age: 63
End: 2023-07-06

## 2023-07-25 ENCOUNTER — APPOINTMENT (OUTPATIENT)
Dept: CARDIOLOGY | Facility: CLINIC | Age: 63
End: 2023-07-25
Payer: COMMERCIAL

## 2023-07-25 ENCOUNTER — NON-APPOINTMENT (OUTPATIENT)
Age: 63
End: 2023-07-25

## 2023-07-25 VITALS
WEIGHT: 186 LBS | HEART RATE: 97 BPM | BODY MASS INDEX: 37.5 KG/M2 | DIASTOLIC BLOOD PRESSURE: 78 MMHG | SYSTOLIC BLOOD PRESSURE: 118 MMHG | RESPIRATION RATE: 16 BRPM | HEIGHT: 59 IN

## 2023-07-25 DIAGNOSIS — I83.899 VARICOSE VEINS OF UNSPECIFIED LOWER EXTREMITY WITH OTHER COMPLICATIONS: ICD-10-CM

## 2023-07-25 PROCEDURE — 99214 OFFICE O/P EST MOD 30 MIN: CPT | Mod: 25

## 2023-07-25 PROCEDURE — 93000 ELECTROCARDIOGRAM COMPLETE: CPT

## 2023-07-25 RX ORDER — AMOXICILLIN AND CLAVULANATE POTASSIUM 875; 125 MG/1; MG/1
875-125 TABLET, COATED ORAL
Qty: 10 | Refills: 0 | Status: DISCONTINUED | COMMUNITY
Start: 2023-05-01 | End: 2023-07-25

## 2023-07-25 NOTE — HISTORY OF PRESENT ILLNESS
[FreeTextEntry1] : Patient presents back to the office today having had her knee surgery successfully without incident.  She also had a lithotripsy and that went well.  She is in need now of a right shoulder surgery because of an injury she had.  She is in a lot of pain with her shoulder but otherwise is doing reasonably well.  She is concerned regarding more varicosities she is noting her legs, especially behind her knees.  These have not been painful or bled but she has been noticing them.  She reports no other physical symptoms at all.  She continues on her medication without a problem.  Patient denies chest pain, shortness of breath, palpitations, orthopnea, presyncope, syncope.

## 2023-07-25 NOTE — DISCUSSION/SUMMARY
[FreeTextEntry1] : 1.  Lower extremity venous Doppler given her varicosities and also some swelling she has had at times.\par 2.  Continue current cardiac meds in doses as noted above for CAD.\par 3.  Monitor BP at home, keep a log and bring to f/u. \par 4.  Patient is intolerant to CPAP.  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  Encourage patient to work on losing weight.\par 6.  Encourage patient to exercise for 30 minutes a day.\par 7.  Encourage patient to move around and not  place.  Additionally can try compression stockings for her varicosities.\par 8. Follow up here in four months. [EKG obtained to assist in diagnosis and management of assessed problem(s)] : EKG obtained to assist in diagnosis and management of assessed problem(s)

## 2023-07-25 NOTE — ASSESSMENT
[FreeTextEntry1] : Nuclear stress test September 25, 2020 was a pharmacologic study which was tolerated well.  Blood pressure response was normal.  EKG showed no evidence of ischemia.  Evaluation of nuclear imaging demonstrated no evidence of ischemia or infarct and an ejection fraction of 68%.\par \par Nuclear stress test February 16, 2022 was a pharmacologic study which was tolerated well.  Blood pressure response to infusion was normal.  EKG showed no evidence of ischemia with infusion.  Evaluation of nuclear imaging showed no evidence of ischemia or infarct and ejection fraction of 57%.\par \par Echocardiogram March 8, 2023 demonstrated left atrial normal size and function with ejection fraction of 60 to 65%.  No significant valvular or structural abnormality.  No evidence of pericardial effusion.\par \par Carotid Doppler March 8, 2023 showed mild plaque with mild stenosis on the left.  No plaque and no stenosis on the right.\par \par EKG: Sinus rhythm with no significant ST or T wave changes.  Short RI.\par \par 62-year-old female with a past medical history of CAD s/p MI and stents, pericarditis, asthma and sarcoidosis who presents for followup.  Patient appears to be reasonably stable from a cardiac standpoint at this time.  She had her surgery without incident and now is going to be a shoulder surgery for which there is still no cardiac contraindication.  She is concerned about some varicosities her legs were likely related to her weight and being in a job where she is on her feet most of the day.  I will do a lower extremity venous Doppler to rule out DVT or any significant venous disease.  I have suggested that she try wearing compression stockings but also to move around more and not  place.  Blood pressure appears to be controlled.  There is no evidence of recurrent ischemia.  EKG is unremarkable.  Blood work earlier this year showed very good control of her lipids.

## 2023-08-31 ENCOUNTER — APPOINTMENT (OUTPATIENT)
Dept: CARDIOLOGY | Facility: CLINIC | Age: 63
End: 2023-08-31
Payer: COMMERCIAL

## 2023-08-31 PROCEDURE — 93970 EXTREMITY STUDY: CPT

## 2023-09-12 ENCOUNTER — APPOINTMENT (OUTPATIENT)
Dept: UROLOGY | Facility: CLINIC | Age: 63
End: 2023-09-12
Payer: COMMERCIAL

## 2023-09-12 ENCOUNTER — OUTPATIENT (OUTPATIENT)
Dept: OUTPATIENT SERVICES | Facility: HOSPITAL | Age: 63
LOS: 1 days | End: 2023-09-12
Payer: COMMERCIAL

## 2023-09-12 DIAGNOSIS — Z95.5 PRESENCE OF CORONARY ANGIOPLASTY IMPLANT AND GRAFT: Chronic | ICD-10-CM

## 2023-09-12 DIAGNOSIS — Z90.722 ACQUIRED ABSENCE OF OVARIES, BILATERAL: Chronic | ICD-10-CM

## 2023-09-12 DIAGNOSIS — R82.994 HYPERCALCIURIA: ICD-10-CM

## 2023-09-12 DIAGNOSIS — R35.0 FREQUENCY OF MICTURITION: ICD-10-CM

## 2023-09-12 PROCEDURE — 76775 US EXAM ABDO BACK WALL LIM: CPT | Mod: 26

## 2023-09-12 PROCEDURE — 76775 US EXAM ABDO BACK WALL LIM: CPT

## 2023-09-12 PROCEDURE — 99213 OFFICE O/P EST LOW 20 MIN: CPT

## 2023-09-13 DIAGNOSIS — N20.0 CALCULUS OF KIDNEY: ICD-10-CM

## 2023-09-13 DIAGNOSIS — R82.994 HYPERCALCIURIA: ICD-10-CM

## 2023-10-09 RX ORDER — TICAGRELOR 60 MG/1
60 TABLET ORAL
Qty: 180 | Refills: 1 | Status: ACTIVE | COMMUNITY
Start: 2022-04-05 | End: 1900-01-01

## 2023-10-15 ENCOUNTER — RX RENEWAL (OUTPATIENT)
Age: 63
End: 2023-10-15

## 2023-11-21 ENCOUNTER — NON-APPOINTMENT (OUTPATIENT)
Age: 63
End: 2023-11-21

## 2023-11-21 ENCOUNTER — APPOINTMENT (OUTPATIENT)
Dept: CARDIOLOGY | Facility: CLINIC | Age: 63
End: 2023-11-21
Payer: COMMERCIAL

## 2023-11-21 VITALS
SYSTOLIC BLOOD PRESSURE: 121 MMHG | RESPIRATION RATE: 16 BRPM | DIASTOLIC BLOOD PRESSURE: 78 MMHG | BODY MASS INDEX: 37.9 KG/M2 | WEIGHT: 188 LBS | HEART RATE: 62 BPM | HEIGHT: 59 IN

## 2023-11-21 DIAGNOSIS — G47.33 OBSTRUCTIVE SLEEP APNEA (ADULT) (PEDIATRIC): ICD-10-CM

## 2023-11-21 DIAGNOSIS — E66.9 OBESITY, UNSPECIFIED: ICD-10-CM

## 2023-11-21 PROCEDURE — 93000 ELECTROCARDIOGRAM COMPLETE: CPT

## 2023-11-21 PROCEDURE — 99214 OFFICE O/P EST MOD 30 MIN: CPT | Mod: 25

## 2023-12-05 NOTE — ED ADULT TRIAGE NOTE - ESI TRIAGE ACUITY LEVEL, MLM
Quality 110: Preventive Care And Screening: Influenza Immunization: Influenza Immunization Administered during Influenza season
Quality 111:Pneumonia Vaccination Status For Older Adults: Pneumococcal vaccine (PPSV23) administered on or after patient’s 60th birthday and before the end of the measurement period
Detail Level: Detailed
3

## 2023-12-12 ENCOUNTER — APPOINTMENT (OUTPATIENT)
Dept: UROLOGY | Facility: CLINIC | Age: 63
End: 2023-12-12

## 2023-12-18 ENCOUNTER — OFFICE (OUTPATIENT)
Dept: URBAN - METROPOLITAN AREA CLINIC 104 | Facility: CLINIC | Age: 63
Setting detail: OPHTHALMOLOGY
End: 2023-12-18
Payer: COMMERCIAL

## 2023-12-18 DIAGNOSIS — H25.13: ICD-10-CM

## 2023-12-18 PROCEDURE — 92014 COMPRE OPH EXAM EST PT 1/>: CPT | Performed by: SPECIALIST

## 2023-12-18 PROCEDURE — 92015 DETERMINE REFRACTIVE STATE: CPT | Performed by: SPECIALIST

## 2023-12-18 ASSESSMENT — REFRACTION_MANIFEST
OD_ADD: +2.50
OD_CYLINDER: -0.50
OD_VA1: 20/20
OD_AXIS: 015
OS_AXIS: 170
OD_SPHERE: -1.25
OS_ADD: +2.50
OS_CYLINDER: -1.00
OS_SPHERE: -0.50
OS_VA1: 20/20

## 2023-12-18 ASSESSMENT — SPHEQUIV_DERIVED
OD_SPHEQUIV: -1.5
OS_SPHEQUIV: -1.625
OS_SPHEQUIV: -1
OD_SPHEQUIV: -2

## 2023-12-18 ASSESSMENT — REFRACTION_CURRENTRX
OD_AXIS: 012
OS_SPHERE: -0.50
OS_CYLINDER: -1.00
OD_CYLINDER: -1.00
OS_ADD: +2.00
OS_AXIS: 172
OD_OVR_VA: 20/
OD_ADD: +2.00
OD_SPHERE: -0.25
OS_OVR_VA: 20/

## 2023-12-18 ASSESSMENT — CONFRONTATIONAL VISUAL FIELD TEST (CVF)
OD_FINDINGS: FULL
OS_FINDINGS: FULL

## 2023-12-18 ASSESSMENT — REFRACTION_AUTOREFRACTION
OD_CYLINDER: -0.50
OD_AXIS: 170
OD_SPHERE: -1.75
OS_CYLINDER: -1.75
OS_AXIS: 174
OS_SPHERE: -0.75

## 2024-01-02 ENCOUNTER — APPOINTMENT (OUTPATIENT)
Dept: UROLOGY | Facility: CLINIC | Age: 64
End: 2024-01-02

## 2024-01-23 ENCOUNTER — APPOINTMENT (OUTPATIENT)
Dept: CARDIOLOGY | Facility: CLINIC | Age: 64
End: 2024-01-23
Payer: COMMERCIAL

## 2024-01-23 ENCOUNTER — NON-APPOINTMENT (OUTPATIENT)
Age: 64
End: 2024-01-23

## 2024-01-23 VITALS
OXYGEN SATURATION: 98 % | DIASTOLIC BLOOD PRESSURE: 80 MMHG | RESPIRATION RATE: 18 BRPM | SYSTOLIC BLOOD PRESSURE: 121 MMHG | HEART RATE: 83 BPM | WEIGHT: 190 LBS | HEIGHT: 59 IN | BODY MASS INDEX: 38.3 KG/M2

## 2024-01-23 DIAGNOSIS — R06.09 OTHER FORMS OF DYSPNEA: ICD-10-CM

## 2024-01-23 DIAGNOSIS — R60.0 LOCALIZED EDEMA: ICD-10-CM

## 2024-01-23 PROCEDURE — 99215 OFFICE O/P EST HI 40 MIN: CPT | Mod: 25

## 2024-01-23 PROCEDURE — 93000 ELECTROCARDIOGRAM COMPLETE: CPT

## 2024-01-30 ENCOUNTER — NON-APPOINTMENT (OUTPATIENT)
Age: 64
End: 2024-01-30

## 2024-02-06 ENCOUNTER — APPOINTMENT (OUTPATIENT)
Dept: UROLOGY | Facility: CLINIC | Age: 64
End: 2024-02-06
Payer: COMMERCIAL

## 2024-02-06 ENCOUNTER — OUTPATIENT (OUTPATIENT)
Dept: OUTPATIENT SERVICES | Facility: HOSPITAL | Age: 64
LOS: 1 days | End: 2024-02-06
Payer: COMMERCIAL

## 2024-02-06 DIAGNOSIS — Z95.5 PRESENCE OF CORONARY ANGIOPLASTY IMPLANT AND GRAFT: Chronic | ICD-10-CM

## 2024-02-06 DIAGNOSIS — Z90.722 ACQUIRED ABSENCE OF OVARIES, BILATERAL: Chronic | ICD-10-CM

## 2024-02-06 DIAGNOSIS — N20.0 CALCULUS OF KIDNEY: ICD-10-CM

## 2024-02-06 PROCEDURE — 76775 US EXAM ABDO BACK WALL LIM: CPT

## 2024-02-06 PROCEDURE — 76775 US EXAM ABDO BACK WALL LIM: CPT | Mod: 26

## 2024-02-06 PROCEDURE — 99213 OFFICE O/P EST LOW 20 MIN: CPT

## 2024-02-07 NOTE — PHYSICAL EXAM
[General Appearance - Well Developed] : well developed [General Appearance - Well Nourished] : well nourished [Heart Rate And Rhythm] : heart rate and rhythm were normal [] : no respiratory distress [Abdomen Soft] : soft [Normal Station and Gait] : the gait and station were normal for the patient's age [No Focal Deficits] : no focal deficits [Skin Color & Pigmentation] : normal skin color and pigmentation [Oriented To Time, Place, And Person] : oriented to person, place, and time [Affect] : the affect was normal [Not Anxious] : not anxious

## 2024-02-07 NOTE — HISTORY OF PRESENT ILLNESS
[None] : None [FreeTextEntry1] : : 1960  Referring Provider: none   HPI: Ms. SHABBIR LEONARDO is a 61 year yo F with a PMHx notable for kidney stones. 60 year old female presenting today for a follow up visit for kidney stones. At the end of 2021, she had a minor heart attack.  Subsequently, she had COVID at the beginning of October. She is doing well today.   The Litholink from  showed low urine output, markedly elevated Johnny and markedly elevated UCa. She completed another Litholink in early October, but the results are not available yet.   The renal US from today demonstrates:  Again visualized bilateral echogenic foci the largest in the right kidney upper pole linear, no posterior shadow slight twinkle artifact likely vascular and a 0.43 cm likely previously visualized non obstructing punctuate calculus. Again visualized a 0.50 cm linear echogenic focus in the left kidney lower pole likely vascular calcification. Both kidneys are normal in size and echogenicity without hydronephrosis or solid masses visualized.  Anticoagulation: Brilinta All: NKDA Social: 2-3 drinks a week, never smoker PMHx: CAD, borderline DM FHx: kidney stones, CAD in family, Breast Cancer in mother PSHx: stent placed for CAD  Imaging: ULS today reviewed as above  : Here today with LL today with much better volume, with Uvol 1.97, good Ca with 178. Back on the HCTZ and salt intake and less than <2g. Discussed low salt diet. Has lower SI joint pain. Discussed keeping the HCTZ.   4/3: ZP US renal with 8 mm and 9 mm stone in the upper and mid pole. Imaging personally reviewed. Discussed ureteroscopy and r/b/a. She is undergoing surgery for her knees in May and would like to consider doing ureteroscopy for stone management as definitive therapy.   : HEre today s/p URS, stent removed. Stone type pending. Diet modification reviewed at length- increasing fluids (primarily water), citrus is good, and decreasing/moderating salt, animal flesh protein, oxalate containing foods, and moderation of calcium intake (1000 mg/day is USRDA).  I reviewed with the patient the risks of metabolic stone disease given their underlying risk parameters (all of which include large stones, multiple stones, bilateral stones, family history, and young age), and the indications for 24 hour urine metabolic assessment. We also discussed benefits of regular exercise and weight loss as independent risk reducers for stones.  : Here today to discuss LL. ULS today without kidney stones. LL reviewed with patient, demonstrated low urine volume and elevated urine calcium levels. Discussed with patient that her HCTZ is helpful however daily dosing isn't helpful given the fact that she was getting hypokalemic and low blood pressure. D/w patient, plan for daily dosing. Needs to make sure she is consuming 1000 mg daily of calcium. Will obtian PTH testing.   24 Pt presents today for renal ULS and LL review- LL shows low fluid intake with borderline calcium levels.  Renal ULS: Findings: There is a 3.9 mm echogenic focus visualized in the upper pole of the right kidney with distal shadow. Both kidneys are normal in size and echogenicity without, hydronephrosis or solid masses visualized. Pt currently without any symptoms- no pain, n/v/d/f/c.   [Urinary Incontinence] : no urinary incontinence [Urinary Retention] : no urinary retention [Urinary Urgency] : no urinary urgency [Urinary Frequency] : no urinary frequency [Nocturia] : no nocturia [Straining] : no straining [Weak Stream] : no weak stream [Intermittency] : no intermittency [Post-Void Dribbling] : no post-void dribbling [Dysuria] : no dysuria

## 2024-02-12 DIAGNOSIS — N20.0 CALCULUS OF KIDNEY: ICD-10-CM

## 2024-02-15 ENCOUNTER — APPOINTMENT (OUTPATIENT)
Dept: CARDIOLOGY | Facility: CLINIC | Age: 64
End: 2024-02-15
Payer: COMMERCIAL

## 2024-02-15 PROCEDURE — 93306 TTE W/DOPPLER COMPLETE: CPT

## 2024-02-22 ENCOUNTER — APPOINTMENT (OUTPATIENT)
Dept: CARDIOLOGY | Facility: CLINIC | Age: 64
End: 2024-02-22
Payer: COMMERCIAL

## 2024-02-22 PROCEDURE — 78452 HT MUSCLE IMAGE SPECT MULT: CPT

## 2024-02-22 PROCEDURE — 93015 CV STRESS TEST SUPVJ I&R: CPT

## 2024-02-22 PROCEDURE — A9500: CPT

## 2024-02-22 RX ADMIN — REGADENOSON 0.4 MG/5ML: 0.08 INJECTION, SOLUTION INTRAVENOUS at 00:00

## 2024-02-24 NOTE — H&P PST ADULT - OPHTHALMOLOGIC
A complete drug regimen review was completed for this patient.     [x]  No clinically significant medication issue was identified    []   Yes, a clinically significant medication issue was identified     []  Adverse Drug Event:      []  Allergy:      []  Side Effect:      []  Ineffective Therapy:      []  Drug Interaction:     []  Duplicated Therapy:     []  Untreated Indication:      []  Non-adherence:     []  Other:      Nursing/Pharmacy contacted the physician:   Date:  2/23/24  Time: 1830     Actions recommended by physician were completed:  Date : 2/23/24 Time: 1830    Electronically signed by Dayanara Wright RN on 2/23/24 at 10:39 PM EST             negative

## 2024-03-01 RX ORDER — REGADENOSON 0.08 MG/ML
0.4 INJECTION, SOLUTION INTRAVENOUS
Refills: 0 | Status: COMPLETED | OUTPATIENT
Start: 2024-02-22

## 2024-03-14 NOTE — ADDENDUM
[FreeTextEntry1] : Nuclear stress test February 22, 2024 was a pharmacologic study which was tolerated well.  Blood pressure response to infusion was normal.  EKG showed no evidence of ischemia with infusion.  Evaluation of nuclear imaging showed no evidence of ischemia or infarct and ejection fraction of 70%.  Echocardiogram February 15, 2024 demonstrated left ventricle normal size and function with ejection fraction of 55 to 60%.  No significant valvular or structural abnormality noted.  Given the findings of her cardiac testing patient is without cardiac contraindication to endoscopy and colonoscopy at this time.  Aspirin Brilinta may be held for 5 days prior to procedure and restarted post procedurally at the discretion of GI.  Continue other current cardiac meds in doses as noted above.  Monitor through the procedure until stable post procedurally.

## 2024-03-14 NOTE — ASSESSMENT
[FreeTextEntry1] : Nuclear stress test September 25, 2020 was a pharmacologic study which was tolerated well. Blood pressure response was normal. EKG showed no evidence of ischemia. Evaluation of nuclear imaging demonstrated no evidence of ischemia or infarct and an ejection fraction of 68%.  Nuclear stress test February 16, 2022 was a pharmacologic study which was tolerated well. Blood pressure response to infusion was normal. EKG showed no evidence of ischemia with infusion. Evaluation of nuclear imaging showed no evidence of ischemia or infarct and ejection fraction of 57%.  Echocardiogram March 8, 2023 demonstrated left atrial normal size and function with ejection fraction of 60 to 65%. No significant valvular or structural abnormality. No evidence of pericardial effusion.  Carotid Doppler March 8, 2023 showed mild plaque with mild stenosis on the left. No plaque and no stenosis on the right.  EKG: Sinus rhythm with no significant ST or T wave changes. Ventricular trigeminy.  63-year-old female with a past medical history of CAD s/p MI and stents, pericarditis, asthma and sarcoidosis who presents for followup. Patient presents back today because she is having shortness of breath on exertion.  Review her blood work does show a drop in her H&H from last year.  Last year her hemoglobin was 12.5 but now it is 10.5.  It is unclear if that is the cause of her shortness of breath but certainly this needs further evaluation.  She is also planning to have a colonoscopy with GI but she should discuss with them whether she should also have an endoscopy.  She also needs to follow-up with her primary regarding the anemia.  Given her history however I will evaluate further and rule out a cardiac cause of her symptoms.  I will start with echocardiogram and stress testing.  I will hold off on catheterization if possible given the possible potential for bleeding as a cause of her anemia and symptoms.  No evidence of heart failure at this time.  EKG shows a sinus rhythm with minimal T wave changes but also some frequent PVCs and I will do a Holter to evaluate this as well.

## 2024-03-14 NOTE — HISTORY OF PRESENT ILLNESS
[FreeTextEntry1] : Patient presents back to the office today because she has been having worsening shortness of breath on exertion over the last month or so.  She says it started slowly but it has become more noticeable.  Now when she walks from the parking lot into work she gets significantly short of breath.  She had been doing some exercising but recently has had to stop because she was getting short of breath and fatigued and felt she could not do it.  She does not have any other symptoms when she exerts herself.  She has not had any chest pain at all.  She did follow-up with her primary and have some blood work and is also planning a colonoscopy as a routine matter.  Patient denies palpitations, orthopnea, presyncope, syncope.

## 2024-03-14 NOTE — DISCUSSION/SUMMARY
[EKG obtained to assist in diagnosis and management of assessed problem(s)] : EKG obtained to assist in diagnosis and management of assessed problem(s) [FreeTextEntry1] : 1.  Echocardiogram and nuclear stress test to further evaluate her shortness of breath on exertion. 2.  Holter monitor to evaluate her frequent PVCs on EKG today and given her symptoms. 3.  She will follow-up with her primary as well also given the anemia and for further workup of that.  She is also following with GI and will discuss with them whether she should have an endoscopy in addition to the colonoscopy that she is planning. 4.  Continue current cardiac meds in doses as noted above for CAD.   5.  Monitor BP at home, keep a log and bring to f/u.  6.  Patient is intolerant to CPAP.  Continue use of dental appliance for treatment of ALLA. 7.  Encourage patient to work on losing weight. 8.  Encourage patient to move around and not  place.  Additionally can try compression stockings for her varicosities. 9. Follow up here after testing.

## 2024-04-03 RX ORDER — KIT FOR THE PREPARATION OF TECHNETIUM TC99M SESTAMIBI 1 MG/5ML
INJECTION, POWDER, LYOPHILIZED, FOR SOLUTION PARENTERAL
Refills: 0 | Status: COMPLETED | OUTPATIENT
Start: 2024-04-03

## 2024-04-03 RX ADMIN — KIT FOR THE PREPARATION OF TECHNETIUM TC99M SESTAMIBI 0: 1 INJECTION, POWDER, LYOPHILIZED, FOR SOLUTION PARENTERAL at 00:00

## 2024-04-09 ENCOUNTER — NON-APPOINTMENT (OUTPATIENT)
Age: 64
End: 2024-04-09

## 2024-04-09 ENCOUNTER — APPOINTMENT (OUTPATIENT)
Dept: CARDIOLOGY | Facility: CLINIC | Age: 64
End: 2024-04-09
Payer: COMMERCIAL

## 2024-04-09 VITALS
WEIGHT: 194 LBS | DIASTOLIC BLOOD PRESSURE: 62 MMHG | RESPIRATION RATE: 16 BRPM | BODY MASS INDEX: 39.11 KG/M2 | HEIGHT: 59 IN | SYSTOLIC BLOOD PRESSURE: 100 MMHG | HEART RATE: 80 BPM

## 2024-04-09 DIAGNOSIS — I49.3 VENTRICULAR PREMATURE DEPOLARIZATION: ICD-10-CM

## 2024-04-09 DIAGNOSIS — I21.9 ACUTE MYOCARDIAL INFARCTION, UNSPECIFIED: ICD-10-CM

## 2024-04-09 DIAGNOSIS — I25.10 ATHEROSCLEROTIC HEART DISEASE OF NATIVE CORONARY ARTERY W/OUT ANGINA PECTORIS: ICD-10-CM

## 2024-04-09 DIAGNOSIS — E78.5 HYPERLIPIDEMIA, UNSPECIFIED: ICD-10-CM

## 2024-04-09 PROCEDURE — G2211 COMPLEX E/M VISIT ADD ON: CPT

## 2024-04-09 PROCEDURE — 93000 ELECTROCARDIOGRAM COMPLETE: CPT

## 2024-04-09 PROCEDURE — 99214 OFFICE O/P EST MOD 30 MIN: CPT

## 2024-04-09 RX ORDER — METOPROLOL SUCCINATE 50 MG/1
50 TABLET, EXTENDED RELEASE ORAL
Qty: 90 | Refills: 1 | Status: ACTIVE | COMMUNITY
Start: 2021-10-04 | End: 1900-01-01

## 2024-05-07 ENCOUNTER — NON-APPOINTMENT (OUTPATIENT)
Age: 64
End: 2024-05-07

## 2024-05-07 RX ORDER — HYDROCHLOROTHIAZIDE 25 MG/1
25 TABLET ORAL EVERY OTHER DAY
Refills: 0 | Status: ACTIVE | COMMUNITY
Start: 2024-05-07

## 2024-05-07 RX ORDER — HYDROCHLOROTHIAZIDE 25 MG/1
25 TABLET ORAL EVERY OTHER DAY
Refills: 3 | Status: ACTIVE | COMMUNITY
Start: 2021-10-04

## 2024-05-13 RX ORDER — ATORVASTATIN CALCIUM 80 MG/1
80 TABLET, FILM COATED ORAL
Qty: 90 | Refills: 1 | Status: ACTIVE | COMMUNITY
Start: 2022-08-01 | End: 1900-01-01

## 2024-05-16 NOTE — ADDENDUM
[FreeTextEntry1] : Nuclear stress test February 22, 2024 was a pharmacologic study which was tolerated well.  Blood pressure response to infusion was normal.  EKG showed no evidence of ischemia with infusion.  Evaluation of nuclear imaging showed no evidence of ischemia or infarct and ejection fraction of 70%.  Echocardiogram February 15, 2024 demonstrated left ventricle normal size and function with ejection fraction of 55 to 60%.  No significant valvular or structural abnormality noted.  Given the findings of her cardiac testing patient is without cardiac contraindication to shoulder surgery at this time.  Aspirin and Brilinta may be held for 5 days prior to procedure and restarted post procedurally at the discretion of GI.  Continue other current cardiac meds in doses as noted above.  Monitor through the procedure until stable post procedurally.

## 2024-05-16 NOTE — HISTORY OF PRESENT ILLNESS
[FreeTextEntry1] : Patient presents back to the office today having successfully had her endoscopy and colonoscopy without incident.  She was found to have diverticulosis and hemorrhoids but no evidence of significant bleeding.  Her anemia has been relatively stable with hemoglobin just above 10.  She is now planning for shoulder surgery.  She still reports issues with shortness of breath on exertion at times.  She admits to not being very active and not doing exercise because of issues with her knee and her shoulder.  Patient denies chest pain, palpitations, orthopnea, presyncope, syncope.

## 2024-05-16 NOTE — ASSESSMENT
[FreeTextEntry1] : Nuclear stress test September 25, 2020 was a pharmacologic study which was tolerated well. Blood pressure response was normal. EKG showed no evidence of ischemia. Evaluation of nuclear imaging demonstrated no evidence of ischemia or infarct and an ejection fraction of 68%.  Nuclear stress test February 16, 2022 was a pharmacologic study which was tolerated well. Blood pressure response to infusion was normal. EKG showed no evidence of ischemia with infusion. Evaluation of nuclear imaging showed no evidence of ischemia or infarct and ejection fraction of 57%.  Echocardiogram March 8, 2023 demonstrated left atrial normal size and function with ejection fraction of 60 to 65%. No significant valvular or structural abnormality. No evidence of pericardial effusion.  Carotid Doppler March 8, 2023 showed mild plaque with mild stenosis on the left. No plaque and no stenosis on the right.  Holter monitor January 23, 2024 showed a presenting in the sinus with an average heart rate of 89 bpm, maximum 124, minimum 65 bpm.  PVCs totaling 8% of all beats were noted with some bigeminy and trigeminy.  No other significant arrhythmia.  Nuclear stress test February 22, 2024 was a pharmacologic study which was tolerated well. Blood pressure response to infusion was normal. EKG showed no evidence of ischemia with infusion. Evaluation of nuclear imaging showed no evidence of ischemia or infarct and ejection fraction of 70%.  Echocardiogram February 15, 2024 demonstrated left ventricle normal size and function with ejection fraction of 55 to 60%. No significant valvular or structural abnormality noted.  EKG: Sinus rhythm with no significant ST or T wave changes.   63-year-old female with a past medical history of CAD s/p MI and stents, pericarditis, asthma and sarcoidosis who presents for followup. Cardiac testing is ultimately unremarkable for a source of her shortness of breath on exertion with a stress test showing no evidence of ischemia and a normal EF.  Echocardiogram shows a normal EF and no significant structural disease.  Her Holter monitor does show significant PVC burden.  I considered increasing her metoprolol but will hold off for now.  I will repeat her Holter at follow-up and then consider additional medication or EP evaluation.  Her ejection fraction remains normal.  She tolerated her endoscopy and colonoscopy that a problem and is now planning shoulder surgery.  There is no cardiac contraindication at this time at moderate cardiovascular risk.  No evidence of heart failure at this time.

## 2024-05-16 NOTE — DISCUSSION/SUMMARY
[FreeTextEntry1] : 1.  Proceed with surgery as planned. 2.  Monitor through the procedure until stable post procedurally. 3.  Continue current cardiac meds in doses as noted above for CAD.   4.  Monitor BP at home, keep a log and bring to f/u.  5.  Patient is intolerant to CPAP.  Continue use of dental appliance for treatment of ALLA.  Recommend follow-up with pulmonary also for her shortness of breath but she is not willing to consider that at this time. 6.  Encourage patient to work on losing weight. 7.  Encourage patient to move around and not  place.  Additionally can try compression stockings for her varicosities. 8. Follow up here in four months.  Holter monitor prior to follow-up.

## 2024-07-02 ENCOUNTER — OFFICE (OUTPATIENT)
Dept: URBAN - METROPOLITAN AREA CLINIC 104 | Facility: CLINIC | Age: 64
Setting detail: OPHTHALMOLOGY
End: 2024-07-02
Payer: COMMERCIAL

## 2024-07-02 DIAGNOSIS — H43.391: ICD-10-CM

## 2024-07-02 DIAGNOSIS — H25.13: ICD-10-CM

## 2024-07-02 PROCEDURE — 92014 COMPRE OPH EXAM EST PT 1/>: CPT | Performed by: SPECIALIST

## 2024-07-02 PROCEDURE — 92015 DETERMINE REFRACTIVE STATE: CPT | Performed by: SPECIALIST

## 2024-07-02 ASSESSMENT — CONFRONTATIONAL VISUAL FIELD TEST (CVF)
OS_FINDINGS: FULL
OD_FINDINGS: FULL

## 2024-07-25 ENCOUNTER — APPOINTMENT (OUTPATIENT)
Dept: CARDIOLOGY | Facility: CLINIC | Age: 64
End: 2024-07-25

## 2024-07-25 ENCOUNTER — NON-APPOINTMENT (OUTPATIENT)
Age: 64
End: 2024-07-25

## 2024-07-25 RX ORDER — APIXABAN 5 MG/1
5 TABLET, FILM COATED ORAL
Qty: 180 | Refills: 1 | Status: ACTIVE | COMMUNITY
Start: 2024-07-25

## 2024-07-25 RX ORDER — UBIDECARENONE/VIT E ACET 100MG-5
25 MCG CAPSULE ORAL
Refills: 0 | Status: ACTIVE | COMMUNITY
Start: 2024-07-25

## 2024-07-25 RX ORDER — TICAGRELOR 60 MG/1
60 TABLET ORAL
Qty: 180 | Refills: 0 | Status: ACTIVE | COMMUNITY
Start: 2024-07-25

## 2024-07-25 RX ORDER — FAMOTIDINE 20 MG/1
20 TABLET, FILM COATED ORAL DAILY
Refills: 0 | Status: ACTIVE | COMMUNITY
Start: 2024-07-25

## 2024-07-25 RX ORDER — ELECTROLYTES/DEXTROSE
SOLUTION, ORAL ORAL DAILY
Refills: 0 | Status: ACTIVE | COMMUNITY
Start: 2024-07-25

## 2024-07-25 RX ORDER — DILTIAZEM HYDROCHLORIDE 180 MG/1
180 CAPSULE, EXTENDED RELEASE ORAL
Refills: 0 | Status: ACTIVE | COMMUNITY
Start: 2024-07-25

## 2024-07-29 ENCOUNTER — APPOINTMENT (OUTPATIENT)
Dept: CARDIOLOGY | Facility: CLINIC | Age: 64
End: 2024-07-29
Payer: COMMERCIAL

## 2024-07-29 VITALS
HEART RATE: 81 BPM | HEIGHT: 59 IN | SYSTOLIC BLOOD PRESSURE: 100 MMHG | RESPIRATION RATE: 16 BRPM | BODY MASS INDEX: 37.9 KG/M2 | DIASTOLIC BLOOD PRESSURE: 60 MMHG | WEIGHT: 188 LBS

## 2024-07-29 DIAGNOSIS — I25.10 ATHEROSCLEROTIC HEART DISEASE OF NATIVE CORONARY ARTERY W/OUT ANGINA PECTORIS: ICD-10-CM

## 2024-07-29 DIAGNOSIS — E78.5 HYPERLIPIDEMIA, UNSPECIFIED: ICD-10-CM

## 2024-07-29 DIAGNOSIS — E66.9 OBESITY, UNSPECIFIED: ICD-10-CM

## 2024-07-29 DIAGNOSIS — I48.0 PAROXYSMAL ATRIAL FIBRILLATION: ICD-10-CM

## 2024-07-29 PROCEDURE — 93000 ELECTROCARDIOGRAM COMPLETE: CPT

## 2024-07-29 PROCEDURE — 99215 OFFICE O/P EST HI 40 MIN: CPT

## 2024-07-29 PROCEDURE — G2211 COMPLEX E/M VISIT ADD ON: CPT | Mod: NC

## 2024-07-29 PROCEDURE — 93224 XTRNL ECG REC UP TO 48 HRS: CPT

## 2024-07-29 RX ORDER — AMIODARONE HYDROCHLORIDE 200 MG/1
200 TABLET ORAL TWICE DAILY
Qty: 180 | Refills: 1 | Status: ACTIVE | COMMUNITY
Start: 2024-07-25

## 2024-07-29 NOTE — DISCUSSION/SUMMARY
[FreeTextEntry1] : 1.  Proceed with surgery as planned. Brilinta may be held for up to 1 week and Eliquis for 48 hours prior to surgery and restart at the discretion of surgery. 2.  Monitor through the procedure until stable post procedurally. 3.  Continue current cardiac meds in doses as noted above for CAD and PAF including atorvastatin 80 mg daily.   4.  Continue anticoagulation for stroke prophylaxis for atrial fibrillation. 5.  Follow-up with EP to consider options for treating her atrial fibrillation including possible ablation versus loop recorder placement.  Amiodarone is planned as a short-term intervention. 6.  Follow-up with her surgeon with regards to her surgery but also with regards to her hematoma.  Her low-grade fevers may be secondary to the hematoma. 7.  Monitor BP at home, keep a log and bring to f/u.  8.  Patient is intolerant to CPAP.  Continue use of dental appliance for treatment of ALLA.   9.  Encourage patient to work on losing weight. 10.  Encourage patient to move around and not  place.  Additionally can try compression stockings for her varicosities. 11. Follow up here in six weeks. [EKG obtained to assist in diagnosis and management of assessed problem(s)] : EKG obtained to assist in diagnosis and management of assessed problem(s)

## 2024-07-29 NOTE — ASSESSMENT
[FreeTextEntry1] : Nuclear stress test September 25, 2020 was a pharmacologic study which was tolerated well. Blood pressure response was normal. EKG showed no evidence of ischemia. Evaluation of nuclear imaging demonstrated no evidence of ischemia or infarct and an ejection fraction of 68%.  Nuclear stress test February 16, 2022 was a pharmacologic study which was tolerated well. Blood pressure response to infusion was normal. EKG showed no evidence of ischemia with infusion. Evaluation of nuclear imaging showed no evidence of ischemia or infarct and ejection fraction of 57%.  Echocardiogram March 8, 2023 demonstrated left atrial normal size and function with ejection fraction of 60 to 65%. No significant valvular or structural abnormality. No evidence of pericardial effusion.  Carotid Doppler March 8, 2023 showed mild plaque with mild stenosis on the left. No plaque and no stenosis on the right.  Holter monitor January 23, 2024 showed a presenting in the sinus with an average heart rate of 89 bpm, maximum 124, minimum 65 bpm.  PVCs totaling 8% of all beats were noted with some bigeminy and trigeminy.  No other significant arrhythmia.  Nuclear stress test February 22, 2024 was a pharmacologic study which was tolerated well. Blood pressure response to infusion was normal. EKG showed no evidence of ischemia with infusion. Evaluation of nuclear imaging showed no evidence of ischemia or infarct and ejection fraction of 70%.  Echocardiogram February 15, 2024 demonstrated left ventricle normal size and function with ejection fraction of 55 to 60%. No significant valvular or structural abnormality noted.  Holter monitor July 25, 2024 demonstrated a presenting rhythm of sinus with an average heart rate of 88 bpm, maximum 117, minimum 64 bpm.  5 total PVCs noted.  PACs totaling 60/h were noted with some bigeminy and trigeminy.  No other significant arrhythmia.  EKG: Sinus rhythm with no significant ST or T wave changes.   63-year-old female with a past medical history of CAD s/p MI and stents, PAF, pericarditis, asthma and sarcoidosis who presents for followup. Patient presents back having been in the hospital with new onset atrial fibrillation.  She converted to sinus and remains in sinus rhythm here today.  Holter monitor shows all sinus rhythm with PACs.  She is now on Eliquis and Brilinta and aspirin was discontinued.  She has been holding the Brilinta in anticipation of hernia surgery later this week.  She does have a hematoma which is firm and tender but apparently has been decreasing in size.  She is going to see her surgeon tomorrow.  I will continue Eliquis for now with plans to hold it prior to her surgery.  No evidence of heart failure at this time.  Holter monitor showed no evidence of recurrent atrial fibrillation.  She is going to follow with EP to consider options for treatment which could include ablation or possibly loop recorder.  Long-term amiodarone is not being considered.  She will continue on diltiazem for now and off metoprolol.  She will also continue off HCTZ given somewhat lower blood pressures now on diltiazem.  She needs to be better about drinking water.  She is planning for hernia surgery and given that she is in sinus rhythm and her recent testing from earlier this year including stress testing that was unremarkable there is no cardiac contraindication to moderate cardiovascular risk.

## 2024-07-29 NOTE — PHYSICAL EXAM
[Well Developed] : well developed [Well Nourished] : well nourished [No Acute Distress] : no acute distress [Normal Venous Pressure] : normal venous pressure [No Carotid Bruit] : no carotid bruit [Normal S1, S2] : normal S1, S2 [No Murmur] : no murmur [No Rub] : no rub [No Gallop] : no gallop [Clear Lung Fields] : clear lung fields [Good Air Entry] : good air entry [No Respiratory Distress] : no respiratory distress  [Soft] : abdomen soft [Non Tender] : non-tender [No Masses/organomegaly] : no masses/organomegaly [Normal Bowel Sounds] : normal bowel sounds [Normal Gait] : normal gait [No Edema] : no edema [No Cyanosis] : no cyanosis [No Clubbing] : no clubbing [No Varicosities] : no varicosities [Moves all extremities] : moves all extremities [No Focal Deficits] : no focal deficits [Normal Speech] : normal speech [Alert and Oriented] : alert and oriented [Normal memory] : normal memory [General Appearance - In No Acute Distress] : no acute distress [Normal Conjunctiva] : the conjunctiva exhibited no abnormalities [Normal Oral Mucosa] : normal oral mucosa [Auscultation Breath Sounds / Voice Sounds] : lungs were clear to auscultation bilaterally [Abdomen Soft] : soft [Abnormal Walk] : normal gait [Skin Color & Pigmentation] : normal skin color and pigmentation [Oriented To Time, Place, And Person] : oriented to person, place, and time [Affect] : the affect was normal [Normal Rate] : normal [Rhythm Regular] : regular [Normal S1] : normal S1 [Normal S2] : normal S2 [I] : a grade 1 [FreeTextEntry1] : Left lower abdomen hematoma noted with tenderness.  Significant ecchymosis around that area. [S3] : no S3 [S4] : no S4

## 2024-07-29 NOTE — HISTORY OF PRESENT ILLNESS
[FreeTextEntry1] : Patient presents back to the office today because she needs to have a hernia repair.  She also was recently in the hospital where she presented with new onset atrial fibrillation.  Patient was dealing with a lot of GI upset and a lot of gas when she started developing palpitations and went to the hospital.  In the ER she was noted to be in atrial fibrillation with rapid ventricular response.  She was treated with diltiazem IV and p.o. and eventually converted back to sinus rhythm.  She was discharged on Multaq and Cardizem.  She developed a reaction to the Multaq and was changed to amiodarone.  While in the hospital she received Lovenox injections and developed a hematoma in her left lower abdomen.  She says this is getting a little better but for the last few days she has had some low-grade temperatures.  She is going to see her surgeon tomorrow.  She continues to have issues with palpitations off and on but no other real symptoms.  Patient denies chest pain, shortness of breath, orthopnea, presyncope, syncope.

## 2024-08-05 ENCOUNTER — NON-APPOINTMENT (OUTPATIENT)
Age: 64
End: 2024-08-05

## 2024-08-05 PROBLEM — I48.91 ATRIAL FIBRILLATION: Status: ACTIVE | Noted: 2024-08-05

## 2024-08-09 ENCOUNTER — NON-APPOINTMENT (OUTPATIENT)
Age: 64
End: 2024-08-09

## 2024-08-12 ENCOUNTER — NON-APPOINTMENT (OUTPATIENT)
Age: 64
End: 2024-08-12

## 2024-08-12 DIAGNOSIS — I31.9 DISEASE OF PERICARDIUM, UNSPECIFIED: ICD-10-CM

## 2024-08-12 RX ORDER — PREDNISONE 10 MG/1
10 TABLET ORAL DAILY
Qty: 14 | Refills: 0 | Status: ACTIVE | COMMUNITY
Start: 2024-08-12 | End: 1900-01-01

## 2024-08-13 ENCOUNTER — APPOINTMENT (OUTPATIENT)
Dept: CARDIOLOGY | Facility: CLINIC | Age: 64
End: 2024-08-13

## 2024-08-27 ENCOUNTER — OUTPATIENT (OUTPATIENT)
Dept: OUTPATIENT SERVICES | Facility: HOSPITAL | Age: 64
LOS: 1 days | End: 2024-08-27
Payer: COMMERCIAL

## 2024-08-27 ENCOUNTER — APPOINTMENT (OUTPATIENT)
Dept: UROLOGY | Facility: CLINIC | Age: 64
End: 2024-08-27
Payer: COMMERCIAL

## 2024-08-27 ENCOUNTER — NON-APPOINTMENT (OUTPATIENT)
Age: 64
End: 2024-08-27

## 2024-08-27 DIAGNOSIS — R35.0 FREQUENCY OF MICTURITION: ICD-10-CM

## 2024-08-27 DIAGNOSIS — Z95.5 PRESENCE OF CORONARY ANGIOPLASTY IMPLANT AND GRAFT: Chronic | ICD-10-CM

## 2024-08-27 DIAGNOSIS — N20.0 CALCULUS OF KIDNEY: ICD-10-CM

## 2024-08-27 DIAGNOSIS — R82.994 HYPERCALCIURIA: ICD-10-CM

## 2024-08-27 DIAGNOSIS — Z90.722 ACQUIRED ABSENCE OF OVARIES, BILATERAL: Chronic | ICD-10-CM

## 2024-08-27 PROCEDURE — 76775 US EXAM ABDO BACK WALL LIM: CPT | Mod: 26

## 2024-08-27 PROCEDURE — 99212 OFFICE O/P EST SF 10 MIN: CPT

## 2024-08-27 PROCEDURE — 76775 US EXAM ABDO BACK WALL LIM: CPT

## 2024-08-27 NOTE — ASSESSMENT
[FreeTextEntry1] : 62 yo F with hypercalciuria  #Hypercalciuria - Will be stopping medication after her ablation in Sept 2024 - REpeat ULS/LL 4 months  #Hypernatiruria - Low salt diet - Lemon water supplementation  ULS/LL 4 months

## 2024-08-27 NOTE — PHYSICAL EXAM
[General Appearance - Well Developed] : well developed [Heart Rate And Rhythm] : heart rate and rhythm were normal [General Appearance - Well Nourished] : well nourished [] : no respiratory distress [Abdomen Soft] : soft [Normal Station and Gait] : the gait and station were normal for the patient's age [Skin Turgor] : supple [No Focal Deficits] : no focal deficits [Oriented To Time, Place, And Person] : oriented to person, place, and time [Affect] : the affect was normal

## 2024-08-27 NOTE — HISTORY OF PRESENT ILLNESS
[Urinary Frequency] : urinary frequency [Nocturia] : nocturia [Dysuria] : dysuria [None] : None [FreeTextEntry1] : : 1960  Referring Provider: none   HPI: Ms. SHABBIR LEONARDO is a 61 year yo F with a PMHx notable for kidney stones. 60 year old female presenting today for a follow up visit for kidney stones. At the end of 2021, she had a minor heart attack.  Subsequently, she had COVID at the beginning of October. She is doing well today.   The Litholink from  showed low urine output, markedly elevated Johnny and markedly elevated UCa. She completed another Litholink in early October, but the results are not available yet.   The renal US from today demonstrates:  Again visualized bilateral echogenic foci the largest in the right kidney upper pole linear, no posterior shadow slight twinkle artifact likely vascular and a 0.43 cm likely previously visualized non obstructing punctuate calculus. Again visualized a 0.50 cm linear echogenic focus in the left kidney lower pole likely vascular calcification. Both kidneys are normal in size and echogenicity without hydronephrosis or solid masses visualized.  Anticoagulation: Brilinta All: NKDA Social: 2-3 drinks a week, never smoker PMHx: CAD, borderline DM FHx: kidney stones, CAD in family, Breast Cancer in mother PSHx: stent placed for CAD  Imaging: ULS today reviewed as above  : Here today with LL today with much better volume, with Uvol 1.97, good Ca with 178. Back on the HCTZ and salt intake and less than <2g. Discussed low salt diet. Has lower SI joint pain. Discussed keeping the HCTZ.   4/3: ZP US renal with 8 mm and 9 mm stone in the upper and mid pole. Imaging personally reviewed. Discussed ureteroscopy and r/b/a. She is undergoing surgery for her knees in May and would like to consider doing ureteroscopy for stone management as definitive therapy.   : HEre today s/p URS, stent removed. Stone type pending. Diet modification reviewed at length- increasing fluids (primarily water), citrus is good, and decreasing/moderating salt, animal flesh protein, oxalate containing foods, and moderation of calcium intake (1000 mg/day is USRDA).  I reviewed with the patient the risks of metabolic stone disease given their underlying risk parameters (all of which include large stones, multiple stones, bilateral stones, family history, and young age), and the indications for 24 hour urine metabolic assessment. We also discussed benefits of regular exercise and weight loss as independent risk reducers for stones.  : Here today to discuss LL. ULS today without kidney stones. LL reviewed with patient, demonstrated low urine volume and elevated urine calcium levels. Discussed with patient that her HCTZ is helpful however daily dosing isn't helpful given the fact that she was getting hypokalemic and low blood pressure. D/w patient, plan for daily dosing. Needs to make sure she is consuming 1000 mg daily of calcium. Will obtian PTH testing.   24 Pt presents today for renal ULS and LL review- LL shows low fluid intake with borderline calcium levels.  Renal ULS: Findings: There is a 3.9 mm echogenic focus visualized in the upper pole of the right kidney with distal shadow. Both kidneys are normal in size and echogenicity without, hydronephrosis or solid masses visualized. Pt currently without any symptoms- no pain, n/v/d/f/c. We discussed that we would observe this kidney stones and while kidney stones are a chronic condition, we should intervene when stones become large enough that passage would be unlikely. At this time, the stone burden appears small and likely capable to passing spontaneously.   24 Pt here for LL review and renal ULS: Findings: There are echogenic foci seen bilaterally with distal shadowing and twinkle artifact, on the right measuring 4.1 mm in the upper to mid pole, in the left measuring 3.1 mm in the midpole. Both kidneys are normal in size and echogenicity without hydronephrosis or solid masses visualized. Pt reports having a UTI for about 2 weeks was going to try to hernia repair - took Cefpodoxime and then took Bactrim with resolve. Pt developed a-fib (on Eliquis) and swelling around the wall of the heart and will go for ablation in early Nov. Pt reports some burning- will send for UA and UCx. Still taking amiodarone and prednisone and Eliquis. We discussed that this may be related to elevated urine calcium.   [Urinary Incontinence] : no urinary incontinence [Urinary Retention] : no urinary retention [Urinary Urgency] : no urinary urgency [Straining] : no straining [Weak Stream] : no weak stream [Intermittency] : no intermittency [Post-Void Dribbling] : no post-void dribbling [Hematuria - Gross] : no gross hematuria

## 2024-08-28 DIAGNOSIS — R82.994 HYPERCALCIURIA: ICD-10-CM

## 2024-08-28 DIAGNOSIS — N20.0 CALCULUS OF KIDNEY: ICD-10-CM

## 2024-09-02 ENCOUNTER — TRANSCRIPTION ENCOUNTER (OUTPATIENT)
Age: 64
End: 2024-09-02

## 2024-09-02 LAB
APPEARANCE: CLEAR
BACTERIA UR CULT: ABNORMAL
BACTERIA: NEGATIVE /HPF
BILIRUBIN URINE: NEGATIVE
BLOOD URINE: NEGATIVE
CALCIUM OXALATE CRYSTALS: PRESENT
CAST: 0 /LPF
COLOR: YELLOW
EPITHELIAL CELLS: 1 /HPF
GLUCOSE QUALITATIVE U: NEGATIVE MG/DL
KETONES URINE: NEGATIVE MG/DL
LEUKOCYTE ESTERASE URINE: NEGATIVE
MICROSCOPIC-UA: NORMAL
NITRITE URINE: NEGATIVE
PH URINE: 5.5
PROTEIN URINE: NEGATIVE MG/DL
RED BLOOD CELLS URINE: 1 /HPF
REVIEW: NORMAL
SPECIFIC GRAVITY URINE: 1.02
UROBILINOGEN URINE: 0.2 MG/DL
WHITE BLOOD CELLS URINE: 0 /HPF

## 2024-09-04 ENCOUNTER — APPOINTMENT (OUTPATIENT)
Dept: CARDIOLOGY | Facility: CLINIC | Age: 64
End: 2024-09-04

## 2024-09-05 ENCOUNTER — NON-APPOINTMENT (OUTPATIENT)
Age: 64
End: 2024-09-05

## 2024-09-11 ENCOUNTER — APPOINTMENT (OUTPATIENT)
Dept: CARDIOLOGY | Facility: CLINIC | Age: 64
End: 2024-09-11
Payer: COMMERCIAL

## 2024-09-11 ENCOUNTER — NON-APPOINTMENT (OUTPATIENT)
Age: 64
End: 2024-09-11

## 2024-09-11 VITALS
SYSTOLIC BLOOD PRESSURE: 126 MMHG | DIASTOLIC BLOOD PRESSURE: 81 MMHG | HEIGHT: 59 IN | RESPIRATION RATE: 16 BRPM | HEART RATE: 70 BPM | WEIGHT: 188 LBS | BODY MASS INDEX: 37.9 KG/M2

## 2024-09-11 DIAGNOSIS — I25.10 ATHEROSCLEROTIC HEART DISEASE OF NATIVE CORONARY ARTERY W/OUT ANGINA PECTORIS: ICD-10-CM

## 2024-09-11 DIAGNOSIS — I48.91 UNSPECIFIED ATRIAL FIBRILLATION: ICD-10-CM

## 2024-09-11 DIAGNOSIS — E66.9 OBESITY, UNSPECIFIED: ICD-10-CM

## 2024-09-11 DIAGNOSIS — E78.5 HYPERLIPIDEMIA, UNSPECIFIED: ICD-10-CM

## 2024-09-11 PROCEDURE — 99214 OFFICE O/P EST MOD 30 MIN: CPT

## 2024-09-11 PROCEDURE — G2211 COMPLEX E/M VISIT ADD ON: CPT | Mod: NC

## 2024-09-11 PROCEDURE — 93000 ELECTROCARDIOGRAM COMPLETE: CPT

## 2024-09-11 NOTE — HISTORY OF PRESENT ILLNESS
[FreeTextEntry1] : Patient presents back today feeling better.  After being on prednisone 5 mg a day she was initially feeling better but still had chest pain.  She was increased to 10 mg a day and then felt better.  We tried to cut back to 5 mg a day but she had chest pain again.  She is now taking 7.5 mg a day and feeling again better.  The chest pain seems to be worse when she lays down flat or over on her left side.  She also notes it is worse with deep breathing.  It feels like a pressure on her chest.  She notes it also sometimes with exertion and sometimes not.  I had recommended she see a rheumatologist and she says she got a name but is yet to make an appointment.  She followed up with EP and is now planning to have an ablation at the end of October.  She is not reporting any palpitations or dizziness or lightheadedness at this time.  Patient denies chest pain, shortness of breath, orthopnea, presyncope, syncope.

## 2024-09-11 NOTE — DISCUSSION/SUMMARY
[FreeTextEntry1] : 1.  No additional cardiac testing at this time. 2.  She will continue prednisone 7.5 mg daily for the next 2 weeks.  She will then lowered to 5 mg daily for 2 weeks.  If she is feeling well she would discontinue at that time and let me know if she has more chest pain. 3.  Continue current cardiac meds in doses as noted above for CAD and PAF including atorvastatin 80 mg daily.   4.  Continue anticoagulation for stroke prophylaxis for atrial fibrillation. 5.  Follow-up with EP for AF ablation. 6.  Monitor BP at home, keep a log and bring to f/u.  7.  Patient is intolerant to CPAP.  Continue use of dental appliance for treatment of ALLA.   8.  Encourage patient to work on losing weight. 9.  Encourage patient to move around and not  place.  Additionally can try compression stockings for her varicosities. 10. Follow-up with rheumatology. 11. Follow up here in two months. [EKG obtained to assist in diagnosis and management of assessed problem(s)] : EKG obtained to assist in diagnosis and management of assessed problem(s)

## 2024-09-11 NOTE — PHYSICAL EXAM
[Well Developed] : well developed [Well Nourished] : well nourished [No Acute Distress] : no acute distress [Normal Venous Pressure] : normal venous pressure [No Carotid Bruit] : no carotid bruit [Normal S1, S2] : normal S1, S2 [No Murmur] : no murmur [No Rub] : no rub [No Gallop] : no gallop [Clear Lung Fields] : clear lung fields [Good Air Entry] : good air entry [No Respiratory Distress] : no respiratory distress  [Soft] : abdomen soft [Non Tender] : non-tender [No Masses/organomegaly] : no masses/organomegaly [Normal Bowel Sounds] : normal bowel sounds [Normal Gait] : normal gait [No Edema] : no edema [No Cyanosis] : no cyanosis [No Clubbing] : no clubbing [No Varicosities] : no varicosities [Moves all extremities] : moves all extremities [No Focal Deficits] : no focal deficits [Normal Speech] : normal speech [Alert and Oriented] : alert and oriented [Normal memory] : normal memory [General Appearance - In No Acute Distress] : no acute distress [Normal Conjunctiva] : the conjunctiva exhibited no abnormalities [Normal Oral Mucosa] : normal oral mucosa [Auscultation Breath Sounds / Voice Sounds] : lungs were clear to auscultation bilaterally [Abdomen Soft] : soft [Abnormal Walk] : normal gait [Skin Color & Pigmentation] : normal skin color and pigmentation [Oriented To Time, Place, And Person] : oriented to person, place, and time [Affect] : the affect was normal [Normal Rate] : normal [Rhythm Regular] : regular [Normal S1] : normal S1 [Normal S2] : normal S2 [I] : a grade 1 [FreeTextEntry1] : No JVD, no carotid bruits. [Abdomen Tenderness] : non-tender [S3] : no S3 [S4] : no S4

## 2024-09-11 NOTE — ASSESSMENT
[FreeTextEntry1] : Nuclear stress test September 25, 2020 was a pharmacologic study which was tolerated well. Blood pressure response was normal. EKG showed no evidence of ischemia. Evaluation of nuclear imaging demonstrated no evidence of ischemia or infarct and an ejection fraction of 68%.  Nuclear stress test February 16, 2022 was a pharmacologic study which was tolerated well. Blood pressure response to infusion was normal. EKG showed no evidence of ischemia with infusion. Evaluation of nuclear imaging showed no evidence of ischemia or infarct and ejection fraction of 57%.  Echocardiogram March 8, 2023 demonstrated left atrial normal size and function with ejection fraction of 60 to 65%. No significant valvular or structural abnormality. No evidence of pericardial effusion.  Carotid Doppler March 8, 2023 showed mild plaque with mild stenosis on the left. No plaque and no stenosis on the right.  Holter monitor January 23, 2024 showed a presenting in the sinus with an average heart rate of 89 bpm, maximum 124, minimum 65 bpm.  PVCs totaling 8% of all beats were noted with some bigeminy and trigeminy.  No other significant arrhythmia.  Nuclear stress test February 22, 2024 was a pharmacologic study which was tolerated well. Blood pressure response to infusion was normal. EKG showed no evidence of ischemia with infusion. Evaluation of nuclear imaging showed no evidence of ischemia or infarct and ejection fraction of 70%.  Echocardiogram February 15, 2024 demonstrated left ventricle normal size and function with ejection fraction of 55 to 60%. No significant valvular or structural abnormality noted.  Holter monitor July 25, 2024 demonstrated a presenting rhythm of sinus with an average heart rate of 88 bpm, maximum 117, minimum 64 bpm.  5 total PVCs noted.  PACs totaling 60/h were noted with some bigeminy and trigeminy.  No other significant arrhythmia.  EKG: Sinus rhythm with no significant ST or T wave changes.   63-year-old female with a past medical history of CAD s/p MI and stents, PAF, pericarditis, asthma and sarcoidosis who presents for followup.

## 2024-09-12 ENCOUNTER — NON-APPOINTMENT (OUTPATIENT)
Age: 64
End: 2024-09-12

## 2024-09-26 ENCOUNTER — APPOINTMENT (OUTPATIENT)
Dept: RHEUMATOLOGY | Facility: CLINIC | Age: 64
End: 2024-09-26

## 2024-09-26 ENCOUNTER — LABORATORY RESULT (OUTPATIENT)
Age: 64
End: 2024-09-26

## 2024-09-26 VITALS
DIASTOLIC BLOOD PRESSURE: 70 MMHG | HEART RATE: 71 BPM | WEIGHT: 188 LBS | OXYGEN SATURATION: 98 % | HEIGHT: 59 IN | SYSTOLIC BLOOD PRESSURE: 110 MMHG | TEMPERATURE: 97.8 F | BODY MASS INDEX: 37.9 KG/M2 | RESPIRATION RATE: 17 BRPM

## 2024-09-26 DIAGNOSIS — Z78.9 OTHER SPECIFIED HEALTH STATUS: ICD-10-CM

## 2024-09-26 DIAGNOSIS — Z63.4 DISAPPEARANCE AND DEATH OF FAMILY MEMBER: ICD-10-CM

## 2024-09-26 DIAGNOSIS — Z83.3 FAMILY HISTORY OF DIABETES MELLITUS: ICD-10-CM

## 2024-09-26 DIAGNOSIS — I31.9 DISEASE OF PERICARDIUM, UNSPECIFIED: ICD-10-CM

## 2024-09-26 DIAGNOSIS — Z82.49 FAMILY HISTORY OF ISCHEMIC HEART DISEASE AND OTHER DISEASES OF THE CIRCULATORY SYSTEM: ICD-10-CM

## 2024-09-26 DIAGNOSIS — Z86.79 PERSONAL HISTORY OF OTHER DISEASES OF THE CIRCULATORY SYSTEM: ICD-10-CM

## 2024-09-26 PROCEDURE — 99204 OFFICE O/P NEW MOD 45 MIN: CPT

## 2024-09-26 SDOH — SOCIAL STABILITY - SOCIAL INSECURITY: DISSAPEARANCE AND DEATH OF FAMILY MEMBER: Z63.4

## 2024-09-26 NOTE — ASSESSMENT
[FreeTextEntry1] : 64 yo woman with history of obesity, asthma, nephrolithiasis, CAD s/p MI ( stents) pAF ( on eliquis and brinlinta) , Knee OA, rotator cuff injury ( s/p surgery) presents with history of pericarditis for about 2 months.  Patient also with historu of RUL mass which per patient report is benign but can not elaborate.    --will check serologies  --will need to obtain information regarding her RUL mass  --she is to continue pericarditis management with her cardiologist.  they can consider colchicine provided no drug drug interactions.   seems to handling taper well.  currently on prednisone 5 daily

## 2024-09-26 NOTE — PHYSICAL EXAM
[General Appearance - Well Nourished] : well nourished [General Appearance - Well Developed] : well developed [Sclera] : the sclera and conjunctiva were normal [Hearing Threshold Finger Rub Not Hart] : hearing was normal [Nail Clubbing] : no clubbing  or cyanosis of the fingernails [Musculoskeletal - Swelling] : no joint swelling seen [Motor Tone] : muscle strength and tone were normal [FreeTextEntry1] : right knee pain on flexion, otherwise no synovitis throughout  and overall FROM  [] : no rash [Skin Lesions] : no skin lesions [Affect] : the affect was normal [Mood] : the mood was normal

## 2024-09-26 NOTE — HISTORY OF PRESENT ILLNESS
[FreeTextEntry1] : 62 yo womna with history of obesity, asthma, nephrolithiasis, CAD s/p MI ( stents) , pAF ( on eliquis, brilinta) , knee OA , rotator cuff injury ( s/p surgery) presents with history of pericarditis for about 2 months.  Pericarditis management by cardio with prednisone taper.  currently down to prednisone 5mg and no CP. sent to rheum to r/o CTD related pericarditis   --this is her first time getting pericarditis which started after being dx with pAF  --of note she is scheduled to have an ablation 10/22/2024  --? lung abnormality followed by [facility] -told it is benign, had biopsy at [facility].  she can elaborate very well .  She states that it is a benign finding.  She will be getting repeat CCT 10/1/2024 and will be seeing he doctor at [facility] shortly after.  ( DR Fletcher) CCT 2019 with RUL mass  No joint swelling states that she has knee OA bone on BOne and will be needing surgery.  She recently had gel injections to her knee.   morning stiffness for a few hours  does not require anything for pain   denies any alopecia, oral lesions, persistent dry eye or dry mouth, red painful eye, nose bleeding, dysphagia, hoarseness, facial rashes, photosensitivity, sob, cough, hx low wbc, plts or anemia that required special treatment, Gi issues, Raynaud's, weakness, DVt/PE, miscarriages.  had 2 ectopic pregnancies.   had IVF and twins - pre-term -one baby down syndrome   denies psoriasis, nail changes, Sauage digits, tennis elbow, de Quervain, plantar fasciitis, Achilles pain, back pain, FH of psoriasis, UC or Crohn's  No hx of STds  PMH: as above Surgery: right rotator cuff surgery 5/2024, R knee meniscal repair, tubal surgery, cholecystectomy, 3 stents  Allergy: ??cardiac drug  MEDs: amiodarone, Lipitor, brillinta, diltiazem, eliquis, famotidine, MVI, prednisone 5mg, vit d  FH: 2 sister with arthritis  SH: lives with with kids, works as a , occasional alcohol. NO smoking or illicit drugs

## 2024-09-26 NOTE — PHYSICAL EXAM
[General Appearance - Well Nourished] : well nourished [General Appearance - Well Developed] : well developed [Sclera] : the sclera and conjunctiva were normal [Hearing Threshold Finger Rub Not Wagoner] : hearing was normal [Nail Clubbing] : no clubbing  or cyanosis of the fingernails [Musculoskeletal - Swelling] : no joint swelling seen [Motor Tone] : muscle strength and tone were normal [FreeTextEntry1] : right knee pain on flexion, otherwise no synovitis throughout  and overall FROM  [] : no rash [Skin Lesions] : no skin lesions [Affect] : the affect was normal [Mood] : the mood was normal

## 2024-09-26 NOTE — HISTORY OF PRESENT ILLNESS
[FreeTextEntry1] : 62 yo womna with history of obesity, asthma, nephrolithiasis, CAD s/p MI ( stents) , pAF ( on eliquis, brilinta) , knee OA , rotator cuff injury ( s/p surgery) presents with history of pericarditis for about 2 months.  Pericarditis management by cardio with prednisone taper.  currently down to prednisone 5mg and no CP. sent to rheum to r/o CTD related pericarditis   --this is her first time getting pericarditis which started after being dx with pAF  --of note she is scheduled to have an ablation 10/22/2024  --? lung abnormality followed by [facility] -told it is benign, had biopsy at [facility].  she can elaborate very well .  She states that it is a benign finding.  She will be getting repeat CCT 10/1/2024 and will be seeing he doctor at [facility] shortly after.  ( DR Fletcher) CCT 2019 with RUL mass  No joint swelling states that she has knee OA bone on BOne and will be needing surgery.  She recently had gel injections to her knee.   morning stiffness for a few hours  does not require anything for pain   denies any alopecia, oral lesions, persistent dry eye or dry mouth, red painful eye, nose bleeding, dysphagia, hoarseness, facial rashes, photosensitivity, sob, cough, hx low wbc, plts or anemia that required special treatment, Gi issues, Raynaud's, weakness, DVt/PE, miscarriages.  had 2 ectopic pregnancies.   had IVF and twins - pre-term -one baby down syndrome   denies psoriasis, nail changes, Sauage digits, tennis elbow, de Quervain, plantar fasciitis, Achilles pain, back pain, FH of psoriasis, UC or Crohn's  No hx of STds  PMH: as above Surgery: right rotator cuff surgery 5/2024, R knee meniscal repair, tubal surgery, cholecystectomy, 3 stents  Allergy: ??cardiac drug  MEDs: amiodarone, Lipitor, brillinta, diltiazem, eliquis, famotidine, MVI, prednisone 5mg, vit d  FH: 2 sister with arthritis  SH: lives with with kids, works as a , occasional alcohol. NO smoking or illicit drugs

## 2024-10-01 ENCOUNTER — NON-APPOINTMENT (OUTPATIENT)
Age: 64
End: 2024-10-01

## 2024-10-01 LAB
ACE BLD-CCNC: 29 U/L
ALBUMIN SERPL ELPH-MCNC: 4.1 G/DL
ALP BLD-CCNC: 89 U/L
ALT SERPL-CCNC: 26 U/L
ANA SER IF-ACNC: NEGATIVE
ANION GAP SERPL CALC-SCNC: 12 MMOL/L
AST SERPL-CCNC: 17 U/L
BASOPHILS # BLD AUTO: 0.07 K/UL
BASOPHILS NFR BLD AUTO: 1.1 %
BILIRUB SERPL-MCNC: 0.4 MG/DL
BUN SERPL-MCNC: 19 MG/DL
C3 SERPL-MCNC: 120 MG/DL
C4 SERPL-MCNC: 28 MG/DL
CALCIUM SERPL-MCNC: 9.2 MG/DL
CCP AB SER IA-ACNC: <8 U/ML
CHLORIDE SERPL-SCNC: 104 MMOL/L
CK SERPL-CCNC: 67 U/L
CO2 SERPL-SCNC: 22 MMOL/L
CREAT SERPL-MCNC: 1.02 MG/DL
CREAT SPEC-SCNC: 125 MG/DL
CREAT/PROT UR: 0.1 RATIO
CRP SERPL-MCNC: <3 MG/L
DSDNA AB SER-ACNC: <1 IU/ML
EGFR: 62 ML/MIN/1.73M2
ENA RNP AB SER IA-ACNC: 0.5 AL
ENA SM AB SER IA-ACNC: <0.2 AL
ENA SS-A AB SER IA-ACNC: 0.2 AL
ENA SS-B AB SER IA-ACNC: <0.2 AL
EOSINOPHIL # BLD AUTO: 0.21 K/UL
EOSINOPHIL NFR BLD AUTO: 3.2 %
ERYTHROCYTE [SEDIMENTATION RATE] IN BLOOD BY WESTERGREN METHOD: 20 MM/HR
GLUCOSE SERPL-MCNC: 86 MG/DL
HCT VFR BLD CALC: 24.5 %
HGB BLD-MCNC: 7.2 G/DL
IMM GRANULOCYTES NFR BLD AUTO: 0.8 %
LYMPHOCYTES # BLD AUTO: 1.67 K/UL
LYMPHOCYTES NFR BLD AUTO: 25.2 %
MAN DIFF?: NORMAL
MCHC RBC-ENTMCNC: 24.1 PG
MCHC RBC-ENTMCNC: 29.4 GM/DL
MCV RBC AUTO: 81.9 FL
MONOCYTES # BLD AUTO: 0.67 K/UL
MONOCYTES NFR BLD AUTO: 10.1 %
MYOGLOBIN SERPL-MCNC: 26 NG/ML
NEUTROPHILS # BLD AUTO: 3.95 K/UL
NEUTROPHILS NFR BLD AUTO: 59.6 %
PLATELET # BLD AUTO: 432 K/UL
POTASSIUM SERPL-SCNC: 4.4 MMOL/L
PROT SERPL-MCNC: 6.9 G/DL
PROT UR-MCNC: 11 MG/DL
RBC # BLD: 2.99 M/UL
RBC # FLD: 17.7 %
RF+CCP IGG SER-IMP: NEGATIVE
RHEUMATOID FACT SER QL: <10 IU/ML
SODIUM SERPL-SCNC: 139 MMOL/L
WBC # FLD AUTO: 6.62 K/UL

## 2024-10-07 ENCOUNTER — APPOINTMENT (OUTPATIENT)
Dept: RHEUMATOLOGY | Facility: CLINIC | Age: 64
End: 2024-10-07
Payer: COMMERCIAL

## 2024-10-07 ENCOUNTER — RX RENEWAL (OUTPATIENT)
Age: 64
End: 2024-10-07

## 2024-10-07 VITALS
DIASTOLIC BLOOD PRESSURE: 70 MMHG | BODY MASS INDEX: 37.9 KG/M2 | HEART RATE: 80 BPM | OXYGEN SATURATION: 97 % | WEIGHT: 188 LBS | SYSTOLIC BLOOD PRESSURE: 120 MMHG | HEIGHT: 59 IN | TEMPERATURE: 98 F

## 2024-10-07 DIAGNOSIS — R07.89 OTHER CHEST PAIN: ICD-10-CM

## 2024-10-07 PROBLEM — M94.0 COSTOCHONDRITIS: Status: ACTIVE | Noted: 2024-10-07

## 2024-10-07 PROCEDURE — 99214 OFFICE O/P EST MOD 30 MIN: CPT

## 2024-10-07 RX ORDER — DICLOFENAC SODIUM 10 MG/G
1 GEL TOPICAL DAILY
Qty: 1 | Refills: 0 | Status: ACTIVE | COMMUNITY
Start: 2024-10-07

## 2024-10-10 ENCOUNTER — OUTPATIENT (OUTPATIENT)
Dept: OUTPATIENT SERVICES | Facility: HOSPITAL | Age: 64
LOS: 1 days | Discharge: HOME | End: 2024-10-10

## 2024-10-10 DIAGNOSIS — Z90.722 ACQUIRED ABSENCE OF OVARIES, BILATERAL: Chronic | ICD-10-CM

## 2024-10-10 DIAGNOSIS — R79.9 ABNORMAL FINDING OF BLOOD CHEMISTRY, UNSPECIFIED: ICD-10-CM

## 2024-10-10 DIAGNOSIS — Z95.5 PRESENCE OF CORONARY ANGIOPLASTY IMPLANT AND GRAFT: Chronic | ICD-10-CM

## 2024-10-11 ENCOUNTER — APPOINTMENT (OUTPATIENT)
Dept: HEMATOLOGY ONCOLOGY | Facility: CLINIC | Age: 64
End: 2024-10-11
Payer: COMMERCIAL

## 2024-10-11 VITALS
OXYGEN SATURATION: 95 % | SYSTOLIC BLOOD PRESSURE: 116 MMHG | BODY MASS INDEX: 37.95 KG/M2 | TEMPERATURE: 97.9 F | WEIGHT: 188.25 LBS | DIASTOLIC BLOOD PRESSURE: 75 MMHG | HEART RATE: 86 BPM | HEIGHT: 59 IN

## 2024-10-11 DIAGNOSIS — D50.9 IRON DEFICIENCY ANEMIA, UNSPECIFIED: ICD-10-CM

## 2024-10-11 PROCEDURE — 99203 OFFICE O/P NEW LOW 30 MIN: CPT

## 2024-10-15 ENCOUNTER — APPOINTMENT (OUTPATIENT)
Dept: CARDIOLOGY | Facility: CLINIC | Age: 64
End: 2024-10-15
Payer: COMMERCIAL

## 2024-10-15 VITALS
RESPIRATION RATE: 16 BRPM | WEIGHT: 190 LBS | BODY MASS INDEX: 38.3 KG/M2 | HEIGHT: 59 IN | HEART RATE: 73 BPM | DIASTOLIC BLOOD PRESSURE: 80 MMHG | SYSTOLIC BLOOD PRESSURE: 116 MMHG

## 2024-10-15 DIAGNOSIS — E78.5 HYPERLIPIDEMIA, UNSPECIFIED: ICD-10-CM

## 2024-10-15 DIAGNOSIS — I48.91 UNSPECIFIED ATRIAL FIBRILLATION: ICD-10-CM

## 2024-10-15 DIAGNOSIS — I25.10 ATHEROSCLEROTIC HEART DISEASE OF NATIVE CORONARY ARTERY W/OUT ANGINA PECTORIS: ICD-10-CM

## 2024-10-15 DIAGNOSIS — M94.0 CHONDROCOSTAL JUNCTION SYNDROME [TIETZE]: ICD-10-CM

## 2024-10-15 DIAGNOSIS — I31.9 DISEASE OF PERICARDIUM, UNSPECIFIED: ICD-10-CM

## 2024-10-15 PROCEDURE — 99215 OFFICE O/P EST HI 40 MIN: CPT

## 2024-10-15 PROCEDURE — 93000 ELECTROCARDIOGRAM COMPLETE: CPT

## 2024-10-15 PROCEDURE — G2211 COMPLEX E/M VISIT ADD ON: CPT | Mod: NC

## 2024-10-15 RX ORDER — DILTIAZEM HYDROCHLORIDE 180 MG/1
180 CAPSULE, EXTENDED RELEASE ORAL DAILY
Qty: 90 | Refills: 1 | Status: ACTIVE | COMMUNITY
Start: 2024-10-15

## 2024-10-15 RX ORDER — PANTOPRAZOLE 40 MG/1
40 TABLET, DELAYED RELEASE ORAL
Refills: 0 | Status: ACTIVE | COMMUNITY

## 2024-10-15 RX ORDER — IRON SUCROSE 20 MG/ML
20 INJECTION, SOLUTION INTRAVENOUS
Refills: 0 | Status: ACTIVE | COMMUNITY

## 2024-11-12 ENCOUNTER — EMERGENCY (EMERGENCY)
Facility: HOSPITAL | Age: 64
LOS: 1 days | Discharge: HOME | End: 2024-11-12
Attending: STUDENT IN AN ORGANIZED HEALTH CARE EDUCATION/TRAINING PROGRAM
Payer: COMMERCIAL

## 2024-11-12 ENCOUNTER — NON-APPOINTMENT (OUTPATIENT)
Age: 64
End: 2024-11-12

## 2024-11-12 VITALS
HEIGHT: 59 IN | TEMPERATURE: 98 F | RESPIRATION RATE: 18 BRPM | SYSTOLIC BLOOD PRESSURE: 136 MMHG | WEIGHT: 192.02 LBS | DIASTOLIC BLOOD PRESSURE: 81 MMHG | HEART RATE: 85 BPM | OXYGEN SATURATION: 97 %

## 2024-11-12 VITALS — HEIGHT: 59 IN | WEIGHT: 192.02 LBS

## 2024-11-12 DIAGNOSIS — Z95.5 PRESENCE OF CORONARY ANGIOPLASTY IMPLANT AND GRAFT: Chronic | ICD-10-CM

## 2024-11-12 DIAGNOSIS — Z90.722 ACQUIRED ABSENCE OF OVARIES, BILATERAL: Chronic | ICD-10-CM

## 2024-11-12 LAB
ALBUMIN SERPL ELPH-MCNC: 4.1 G/DL — SIGNIFICANT CHANGE UP (ref 3.3–5.2)
ALP SERPL-CCNC: 136 U/L — HIGH (ref 40–120)
ALT FLD-CCNC: 37 U/L — HIGH
ANION GAP SERPL CALC-SCNC: 14 MMOL/L — SIGNIFICANT CHANGE UP (ref 5–17)
ANISOCYTOSIS BLD QL: SLIGHT — SIGNIFICANT CHANGE UP
APTT BLD: 28.9 SEC — SIGNIFICANT CHANGE UP (ref 24.5–35.6)
AST SERPL-CCNC: 30 U/L — SIGNIFICANT CHANGE UP
BASOPHILS # BLD AUTO: 0.07 K/UL — SIGNIFICANT CHANGE UP (ref 0–0.2)
BASOPHILS NFR BLD AUTO: 1 % — SIGNIFICANT CHANGE UP (ref 0–2)
BILIRUB SERPL-MCNC: 0.3 MG/DL — LOW (ref 0.4–2)
BUN SERPL-MCNC: 11.2 MG/DL — SIGNIFICANT CHANGE UP (ref 8–20)
CALCIUM SERPL-MCNC: 9.1 MG/DL — SIGNIFICANT CHANGE UP (ref 8.4–10.5)
CHLORIDE SERPL-SCNC: 101 MMOL/L — SIGNIFICANT CHANGE UP (ref 96–108)
CO2 SERPL-SCNC: 24 MMOL/L — SIGNIFICANT CHANGE UP (ref 22–29)
CREAT SERPL-MCNC: 0.85 MG/DL — SIGNIFICANT CHANGE UP (ref 0.5–1.3)
D DIMER BLD IA.RAPID-MCNC: 196 NG/ML DDU — SIGNIFICANT CHANGE UP
EGFR: 77 ML/MIN/1.73M2 — SIGNIFICANT CHANGE UP
EOSINOPHIL # BLD AUTO: 0.2 K/UL — SIGNIFICANT CHANGE UP (ref 0–0.5)
EOSINOPHIL NFR BLD AUTO: 2.7 % — SIGNIFICANT CHANGE UP (ref 0–6)
GLUCOSE SERPL-MCNC: 86 MG/DL — SIGNIFICANT CHANGE UP (ref 70–99)
HCT VFR BLD CALC: 37.4 % — SIGNIFICANT CHANGE UP (ref 34.5–45)
HGB BLD-MCNC: 11.7 G/DL — SIGNIFICANT CHANGE UP (ref 11.5–15.5)
IMM GRANULOCYTES NFR BLD AUTO: 0.3 % — SIGNIFICANT CHANGE UP (ref 0–0.9)
INR BLD: 0.96 RATIO — SIGNIFICANT CHANGE UP (ref 0.85–1.16)
LYMPHOCYTES # BLD AUTO: 1.64 K/UL — SIGNIFICANT CHANGE UP (ref 1–3.3)
LYMPHOCYTES # BLD AUTO: 22.3 % — SIGNIFICANT CHANGE UP (ref 13–44)
MANUAL SMEAR VERIFICATION: SIGNIFICANT CHANGE UP
MCHC RBC-ENTMCNC: 26.8 PG — LOW (ref 27–34)
MCHC RBC-ENTMCNC: 31.3 G/DL — LOW (ref 32–36)
MCV RBC AUTO: 85.8 FL — SIGNIFICANT CHANGE UP (ref 80–100)
MONOCYTES # BLD AUTO: 0.78 K/UL — SIGNIFICANT CHANGE UP (ref 0–0.9)
MONOCYTES NFR BLD AUTO: 10.6 % — SIGNIFICANT CHANGE UP (ref 2–14)
NEUTROPHILS # BLD AUTO: 4.65 K/UL — SIGNIFICANT CHANGE UP (ref 1.8–7.4)
NEUTROPHILS NFR BLD AUTO: 63.1 % — SIGNIFICANT CHANGE UP (ref 43–77)
NT-PROBNP SERPL-SCNC: 82 PG/ML — SIGNIFICANT CHANGE UP (ref 0–300)
OVALOCYTES BLD QL SMEAR: SLIGHT — SIGNIFICANT CHANGE UP
PLAT MORPH BLD: NORMAL — SIGNIFICANT CHANGE UP
PLATELET # BLD AUTO: 275 K/UL — SIGNIFICANT CHANGE UP (ref 150–400)
POIKILOCYTOSIS BLD QL AUTO: SLIGHT — SIGNIFICANT CHANGE UP
POLYCHROMASIA BLD QL SMEAR: SLIGHT — SIGNIFICANT CHANGE UP
POTASSIUM SERPL-MCNC: 4.5 MMOL/L — SIGNIFICANT CHANGE UP (ref 3.5–5.3)
POTASSIUM SERPL-SCNC: 4.5 MMOL/L — SIGNIFICANT CHANGE UP (ref 3.5–5.3)
PROT SERPL-MCNC: 7.4 G/DL — SIGNIFICANT CHANGE UP (ref 6.6–8.7)
PROTHROM AB SERPL-ACNC: 10.9 SEC — SIGNIFICANT CHANGE UP (ref 9.9–13.4)
RBC # BLD: 4.36 M/UL — SIGNIFICANT CHANGE UP (ref 3.8–5.2)
RBC # FLD: 25.9 % — HIGH (ref 10.3–14.5)
RBC BLD AUTO: ABNORMAL
SODIUM SERPL-SCNC: 139 MMOL/L — SIGNIFICANT CHANGE UP (ref 135–145)
TROPONIN T, HIGH SENSITIVITY RESULT: 7 NG/L — SIGNIFICANT CHANGE UP (ref 0–51)
WBC # BLD: 7.36 K/UL — SIGNIFICANT CHANGE UP (ref 3.8–10.5)
WBC # FLD AUTO: 7.36 K/UL — SIGNIFICANT CHANGE UP (ref 3.8–10.5)

## 2024-11-12 PROCEDURE — 83880 ASSAY OF NATRIURETIC PEPTIDE: CPT

## 2024-11-12 PROCEDURE — 99285 EMERGENCY DEPT VISIT HI MDM: CPT

## 2024-11-12 PROCEDURE — 85610 PROTHROMBIN TIME: CPT

## 2024-11-12 PROCEDURE — 85730 THROMBOPLASTIN TIME PARTIAL: CPT

## 2024-11-12 PROCEDURE — 96374 THER/PROPH/DIAG INJ IV PUSH: CPT

## 2024-11-12 PROCEDURE — 85379 FIBRIN DEGRADATION QUANT: CPT

## 2024-11-12 PROCEDURE — 99285 EMERGENCY DEPT VISIT HI MDM: CPT | Mod: 25

## 2024-11-12 PROCEDURE — 36415 COLL VENOUS BLD VENIPUNCTURE: CPT

## 2024-11-12 PROCEDURE — 93005 ELECTROCARDIOGRAM TRACING: CPT

## 2024-11-12 PROCEDURE — 99284 EMERGENCY DEPT VISIT MOD MDM: CPT

## 2024-11-12 PROCEDURE — 84484 ASSAY OF TROPONIN QUANT: CPT

## 2024-11-12 PROCEDURE — 96375 TX/PRO/DX INJ NEW DRUG ADDON: CPT

## 2024-11-12 PROCEDURE — 71045 X-RAY EXAM CHEST 1 VIEW: CPT | Mod: 26

## 2024-11-12 PROCEDURE — 71045 X-RAY EXAM CHEST 1 VIEW: CPT

## 2024-11-12 PROCEDURE — 93010 ELECTROCARDIOGRAM REPORT: CPT

## 2024-11-12 PROCEDURE — 80053 COMPREHEN METABOLIC PANEL: CPT

## 2024-11-12 PROCEDURE — 85025 COMPLETE CBC W/AUTO DIFF WBC: CPT

## 2024-11-12 RX ORDER — ACETAMINOPHEN 500 MG
1000 TABLET ORAL ONCE
Refills: 0 | Status: COMPLETED | OUTPATIENT
Start: 2024-11-12 | End: 2024-11-12

## 2024-11-12 RX ORDER — FUROSEMIDE 40 MG
20 TABLET ORAL ONCE
Refills: 0 | Status: COMPLETED | OUTPATIENT
Start: 2024-11-12 | End: 2024-11-12

## 2024-11-12 RX ADMIN — Medication 400 MILLIGRAM(S): at 14:51

## 2024-11-12 RX ADMIN — Medication 20 MILLIGRAM(S): at 17:06

## 2024-11-12 NOTE — CONSULT NOTE ADULT - ASSESSMENT
63-year-old femaleJehovah's Witness. Past medical history of CAD s/p MI and stents, PAF, pericarditis, asthma and sarcoidosis, Recent admission due to severe anemia, Endoscopy was unrevealing off antiplatelets and anticoagulation since then, patient also has hx of renal calculi, S/P lithotripsy. Patient presents with chest pain.  Patient states that the chest pain began last night.  Chest pain is 6–7/10 at worst with turning onto her left side and pleuritic. Pt states that she has not been on AC since the GI bleed. States her ankles have been more swollen than usual and has been having SOB w/ exertion. States she has a new redness/rash on R ankle associated with the onset of CP. Denies fevers, chills, abd pain, n/v/d, denies urinary sxs, travel, sick contacts. 63-year-old femaleJehovah's Witness. Past medical history of CAD s/p MI and stents, PAF, pericarditis, asthma and sarcoidosis, Recent admission due to severe anemia, Endoscopy was unrevealing off antiplatelets and anticoagulation since then, patient also has hx of renal calculi, S/P lithotripsy. Patient presents with constant chest pain.  Patient states that the chest pain began last night.  Chest pain is 6–7/10 at worst with turning onto her left side and pleuritic, improved with tylenol. Pt states that she has not been on AC since the GI bleed. States her ankles have been more swollen than usual and has been having SOB w/ exertion. States she has a new redness/rash on R ankle associated with the onset of CP. Denies fevers, chills, abd pain, n/v/d, denies urinary sxs, travel, sick contacts.      Nonanginal chest pain   leg edema likely from venous insufficiency   unremarkable cardiac testing in the ED     plan   iv lasix 20mg once   outpatient follow up with Dr Mac   please call us back with questions or concerns.

## 2024-11-12 NOTE — ED ADULT NURSE NOTE - NSFALLUNIVINTERV_ED_ALL_ED
Bed/Stretcher in lowest position, wheels locked, appropriate side rails in place/Call bell, personal items and telephone in reach/Instruct patient to call for assistance before getting out of bed/chair/stretcher/Non-slip footwear applied when patient is off stretcher/[place] to call system/Physically safe environment - no spills, clutter or unnecessary equipment/Purposeful proactive rounding/Room/bathroom lighting operational, light cord in reach

## 2024-11-12 NOTE — ED PROVIDER NOTE - CLINICAL SUMMARY MEDICAL DECISION MAKING FREE TEXT BOX
63-year-old female with a history of  renal calculi, S/P lithotripsy 2011, upper GI bleed, CAD with stents 2018 and 2021 not on anticoagulation, Bahai, presenting with chest pain.  Patient states that the chest pain began last night.  Chest pain is 6–7/10 at worst with turning onto her left side and pleuritic.    VSS, SpO2 98% on RA, not tachycardic, normotensive.  Pt does not have signs of fluid overload. R/o ACS. Will obtain cbc cmp trop d-dimer pro-bnp, ekg, cxr and consult cardiology. Control pain and reassess.

## 2024-11-12 NOTE — ED PROVIDER NOTE - CARE PROVIDER_API CALL
Dejon Mac  Cardiology  [address] [place] [address]  Phone: [phone]  Fax: [phone]  Follow Up Time: 1-3 Days

## 2024-11-12 NOTE — ED ADULT NURSE NOTE - HOW PATIENT ADDRESSED, PROFILE
Post-Care Instructions: Patient instructed to not lie down for 4 hours and limit physical activity for 24 hours. SHABBIR GARVIN

## 2024-11-12 NOTE — ED ADULT NURSE NOTE - IS THE PATIENT ABLE TO BE SCREENED?
1/29/2021         RE: Madyson Murray  [address]  [place] [address]        Dear Colleague,    Thank you for referring your patient, Madyson Murray, to the [facility] [place]. Please see a copy of my visit note below.    Spine Surgery Return Clinic Visit      Chief Complaint:   RECHECK (PAC review and discuss surgery )      Interval HPI:  Symptom Profile Including: location of symptoms, onset, severity, exacerbating/alleviating factors, previous treatments:        Madyson Murray is a 63 year old female who I met with in person today for the first time to discuss her degenerative scoliosis.  We had a virtual visit on January 19 and at that time I ordered some planning studies to try and see if she would be a candidate for surgery.  Her history is unchanged from that visit.  She did see our anesthesia clinic today and they had recommended an echocardiogram if she decides to proceed to surgery.    Of note, vitals were obtained and her BMI is 46 which I did not know at the time of the virtual visit.            Past Medical History:     Past Medical History:   Diagnosis Date     Chronic bilateral low back pain without sciatica      Obesity      DAE (obstructive sleep apnea)      Pulmonary hypertension (H)      Tricuspid regurgitation      Venous (peripheral) insufficiency             Past Surgical History:     Past Surgical History:   Procedure Laterality Date     ORTHOPEDIC SURGERY      2010 and 2012            Social History:     Social History     Tobacco Use     Smoking status: Former Smoker     Packs/day: 1.00     Years: 15.00     Pack years: 15.00     Types: Cigarettes     Smokeless tobacco: Never Used     Tobacco comment: quit around age 30   Substance Use Topics     Alcohol use: Yes     Comment: 2-4 drinks couple times per week            Family History:     Family History   Problem Relation Age of Onset     Hypertension Mother      Diabetes Father      Hypertension  How Severe Is Your Skin Lesion?: mild Father      Parkinsonism Father      Diabetes Sister      Hypertension Sister      Sleep Apnea Sister      Sleep Apnea Brother             Allergies:     Allergies   Allergen Reactions     Liquid Adhesive             Medications:     Current Outpatient Medications   Medication     acetaminophen (TYLENOL) 500 MG tablet     losartan (COZAAR) 25 MG tablet     meloxicam (MOBIC) 15 MG tablet     No current facility-administered medications for this visit.              Review of Systems:   A focused musculoskeletal and neurologic ROS was performed with pertinent positives and negatives noted in the HPI.  Additional systems were also reviewed and are documented at the bottom of the note.         Physical Exam:   Vitals: There were no vitals taken for this visit.  Musculoskeletal, Neurologic, and Spine:            Lumbar Spine:    Appearance - No gross stepoffs or deformities    Motor -     L2-3: Hip flexion 5/5 R and 5/5 L strength          L3/4:  Knee extension R 5/5 and L 5/5 strength         L4/5:  Foot dorsiflexion R 5/5 L 5/5 and       EHL dorsiflexion R 4/5 L 4/5 strength         S1:  Plantarflexion/Peroneal Muscles  R 5/5 and L 5/5 strength    Sensation: intact to light touch L3-S1 distribution BLE      Neurologic:      REFLEXES Left Right                  Patella 1+ 1+   Ankle jerk 1+ 1+   Babinski No upgoing great toe No upgoing great toe   Clonus 0 beats 0 beats     Hip Exam:  No pain with hip log roll and no tenderness over the greater trochanters.    Alignment:  Patient stands with a neutral right coronal imbalance           Imaging:   We ordered and independently reviewed new radiographs at this clinic visit. The results were discussed with the patient. Findings include:     Standing scoliosis radiographs obtained January 28 show severe degenerative scoliosis with right coronal imbalance    I also reviewed her CT of the thoracic and lumbar spine which shows severe degenerative changes    I reviewed her bone  Has Your Skin Lesion Been Treated?: not been treated Is This A New Presentation, Or A Follow-Up?: Skin Lesion density study which shows near normal bone density       Assessment and Plan:     63 year old female with morbid obesity and degenerative scoliosis.    We had a discussion today about the risks and benefits of surgery.  I explained that surgery is high risk in patients with a BMI over 40.  I expressed sympathy for the patient and I did not realize that her BMI was elevated prior to ordering some of the planning studies.  She expressed some dissatisfaction given that she had completed imaging recently in which she had known this prior to doing the imaging.  I expressed sympathy and I think that is certainly a reasonable position, but I did not know her BMI until the in person visit today.  I explained the issue is that with a BMI over 40, we have institutional protocols in place that recommend against surgery because of the high risk of infection and mechanical complications.  I explained that to bring her into an acceptable category of risk most likely she would need to lose approximately 50 pounds.  We would certainly want her BMI to be under 40 to be a candidate.  She expressed significant dissatisfaction as it is hard for her to move and walk.  I offered her referral to nutrition or to bariatric surgery to see if they could help her.  She is going to think about this and if she wants to meet with them she will call and I will make that referral.  I would be happy to see her again or discuss further if she is able to pursue weight loss.       Respectfully,  Gab León MD  Spine Surgery  [facility]   Is This A New Presentation, Or A Follow-Up?: Growth Yes

## 2024-11-12 NOTE — ED ADULT NURSE NOTE - OBJECTIVE STATEMENT
Pt presents to ED a&ox4 c/o chest pain . Pt states she was seen by cardiologist today for chest pain and swollen ankles. Pt was instructed to come to ED. Pt reports hx od CAD and stents, pt was taken off blood thinners due to low hemoglobin. Pt is [place]. NAD noted, respirations are equal and unlabored.

## 2024-11-12 NOTE — ED PROVIDER NOTE - PATIENT PORTAL LINK FT
You can access the FollowMyHealth Patient Portal offered by [facility] by registering at the following website: [email or web address]. By joining [employer]’s FollowMyHealth portal, you will also be able to view your health information using other applications (apps) compatible with our system.

## 2024-11-12 NOTE — ED PROVIDER NOTE - OBJECTIVE STATEMENT
63-year-old female with a history of  renal calculi, S/P lithotripsy 2011, upper GI bleed, CAD with stents 2018 and 2021 not on anticoagulation, Uatsdin, presenting with chest pain.  Patient states that the chest pain began last night.  Chest pain is 6–7/10 at worst with turning onto her left side and pleuritic. Pt states that she has not been on AC since the GI bleed. States her ankles have been more swollen than usual and has been having SOB w/ exertion. States she has a new redness/rash on R ankle associated with the onset of CP. Denies fevers, chills, abd pain, n/v/d, denies urinary sxs, travel, sick contacts.

## 2024-11-12 NOTE — CONSULT NOTE ADULT - SUBJECTIVE AND OBJECTIVE BOX
CHIEF COMPLAINT:    HPI: 63-year-old femaleJehovah's Witness. Past medical history of CAD s/p MI and stents, PAF, pericarditis, asthma and sarcoidosis, Recent admission due to severe anemia, Endoscopy was unrevealing off antiplatelets and anticoagulation since then, patient also has hx of renal calculi, S/P lithotripsy. Patient presents with chest pain.  Patient states that the chest pain began last night.  Chest pain is 6–7/10 at worst with turning onto her left side and pleuritic. Pt states that she has not been on AC since the GI bleed. States her ankles have been more swollen than usual and has been having SOB w/ exertion. States she has a new redness/rash on R ankle associated with the onset of CP. Denies fevers, chills, abd pain, n/v/d, denies urinary sxs, travel, sick contacts.    PAST MEDICAL & SURGICAL HISTORY:  Gall Stone      CAD (coronary artery disease)      HLD (hyperlipidemia)      History of Cholecystectomy      Ureteral stent left 1/5/11      Breast Cyst resection - 7 yrs ago      Stented coronary artery      S/P BSO (bilateral salpingo-oophorectomy)          Allergies    No Known Allergies    Intolerances        MEDICATIONS  (STANDING):    MEDICATIONS  (PRN):      FAMILY HISTORY:      ***No family history of premature coronary artery disease or sudden cardiac death    SOCIAL HISTORY:  Smoking-  Alcohol-  Illicit Drug use-    REVIEW OF SYSTEMS:  Constitutional: [ ] fever, [ ]weight loss,  [ ]fatigue  Eyes: [ ] visual changes  Respiratory: [ ]shortness of breath;  [ ] cough, [ ]wheezing, [ ]chills, [ ]hemoptysis  Cardiovascular: [ X chest pain, [ ]palpitations, [ ]dizziness,  [ ]leg swelling [ ]syncope  Gastrointestinal: [ ] abdominal pain, [ ]nausea, [ ]vomiting,  [ ]diarrhea   Genitourinary: [ ] dysuria, [ ] hematuria  Neurologic: [ ] headaches [ ] tremors  [ ] weakness [ ] lightheadedness  Skin: [ ] itching, [ ]burning, [ ] rashes  Endocrine: [ ] heat or cold intolerance  Musculoskeletal: [ ] joint pain or swelling; [ ] muscle, back, or extremity pain  Psychiatric: [ ] depression, [ ]anxiety, [ ]mood swings, or [ ]difficulty sleeping  Hematologic: [ ] easy bruising, [ ] bleeding gums     [ x] All others negative	  [ ] Unable to obtain    Vital Signs Last 24 Hrs  T(C): 36.7 (12 Nov 2024 13:34), Max: 36.7 (12 Nov 2024 13:34)  T(F): 98 (12 Nov 2024 13:34), Max: 98 (12 Nov 2024 13:34)  HR: 85 (12 Nov 2024 13:34) (85 - 85)  BP: 136/81 (12 Nov 2024 13:34) (136/81 - 136/81)  BP(mean): --  RR: 18 (12 Nov 2024 13:34) (18 - 18)  SpO2: 97% (12 Nov 2024 13:34) (97% - 97%)    Parameters below as of 12 Nov 2024 13:34  Patient On (Oxygen Delivery Method): room air      I&O's Summary      PHYSICAL EXAM:  General: No acute distress  HEENT: EOMI  Neck:  No JVD  Lungs: Clear to auscultation bilaterally; No rales or wheezing  Heart: Regular rate and rhythm; No murmurs, rubs, or gallops  Abdomen: soft, non tender, non distended   Extremities: warm, no edema   Nervous system:  Alert & Oriented X3  Psychiatric: Normal affect  Skin: No rashes or lesions    LABS:        Creatinine Trend:         Lipid Panel:   Cardiac Enzymes:           RADIOLOGY    ECG [11/12/24]: NSR    TELEMETRY:    Nuclear stress test February 22, 2024 was a pharmacologic study which was tolerated well. Blood pressure response to infusion was normal. EKG showed no evidence of ischemia with infusion. Evaluation of nuclear imaging showed no evidence of ischemia or infarct and ejection fraction of 70%.  ?  Echocardiogram February 15, 2024 demonstrated left ventricle normal size and function with ejection fraction of 55 to 60%. No significant valvular or structural abnormality noted.    CATHETERIZATION: CHIEF COMPLAINT:    HPI: 63-year-old femaleJehovah's Witness. Past medical history of CAD s/p MI and stents, PAF, pericarditis, asthma and sarcoidosis, Recent admission due to severe anemia, Endoscopy was unrevealing off antiplatelets and anticoagulation since then, patient also has hx of renal calculi, S/P lithotripsy. Patient presents with constant chest pain.  Patient states that the chest pain began last night.  Chest pain is 6–7/10 at worst with turning onto her left side and pleuritic, improved with tylenol. Pt states that she has not been on AC since the GI bleed. States her ankles have been more swollen than usual and has been having SOB w/ exertion. States she has a new redness/rash on R ankle associated with the onset of CP. Denies fevers, chills, abd pain, n/v/d, denies urinary sxs, travel, sick contacts.    PAST MEDICAL & SURGICAL HISTORY:  Gall Stone      CAD (coronary artery disease)      HLD (hyperlipidemia)      History of Cholecystectomy      Ureteral stent left 1/5/11      Breast Cyst resection - 7 yrs ago      Stented coronary artery      S/P BSO (bilateral salpingo-oophorectomy)          Allergies    No Known Allergies    Intolerances        MEDICATIONS  (STANDING):    MEDICATIONS  (PRN):      FAMILY HISTORY:      ***No family history of premature coronary artery disease or sudden cardiac death    SOCIAL HISTORY:  Smoking-  Alcohol-  Illicit Drug use-    REVIEW OF SYSTEMS:  Constitutional: [ ] fever, [ ]weight loss,  [ ]fatigue  Eyes: [ ] visual changes  Respiratory: [ ]shortness of breath;  [ ] cough, [ ]wheezing, [ ]chills, [ ]hemoptysis  Cardiovascular: [ X] chest pain, [ ]palpitations, [ ]dizziness,  [x ]leg swelling [ ]syncope  Gastrointestinal: [ ] abdominal pain, [ ]nausea, [ ]vomiting,  [ ]diarrhea   Genitourinary: [ ] dysuria, [ ] hematuria  Neurologic: [ ] headaches [ ] tremors  [ ] weakness [ ] lightheadedness  Skin: [ ] itching, [ ]burning, [ ] rashes  Endocrine: [ ] heat or cold intolerance  Musculoskeletal: [ ] joint pain or swelling; [ ] muscle, back, or extremity pain  Psychiatric: [ ] depression, [ ]anxiety, [ ]mood swings, or [ ]difficulty sleeping  Hematologic: [ ] easy bruising, [ ] bleeding gums     [ x] All others negative	  [ ] Unable to obtain    Vital Signs Last 24 Hrs  T(C): 36.7 (12 Nov 2024 13:34), Max: 36.7 (12 Nov 2024 13:34)  T(F): 98 (12 Nov 2024 13:34), Max: 98 (12 Nov 2024 13:34)  HR: 85 (12 Nov 2024 13:34) (85 - 85)  BP: 136/81 (12 Nov 2024 13:34) (136/81 - 136/81)  BP(mean): --  RR: 18 (12 Nov 2024 13:34) (18 - 18)  SpO2: 97% (12 Nov 2024 13:34) (97% - 97%)    Parameters below as of 12 Nov 2024 13:34  Patient On (Oxygen Delivery Method): room air      I&O's Summary      PHYSICAL EXAM:  General: No acute distress  HEENT: EOMI  Neck:  No JVD  Lungs: Clear to auscultation bilaterally; No rales or wheezing  Heart: Regular rate and rhythm; No murmurs, rubs, or gallops  Abdomen: soft, non tender, non distended   Extremities: warm, trace edema RLE  Nervous system:  Alert & Oriented X3  Psychiatric: Normal affect  Skin: No rashes or lesions    LABS:        Creatinine Trend:         Lipid Panel:   Cardiac Enzymes:           RADIOLOGY    ECG [11/12/24]: NSR    TELEMETRY: SR    Nuclear stress test February 22, 2024 was a pharmacologic study which was tolerated well. Blood pressure response to infusion was normal. EKG showed no evidence of ischemia with infusion. Evaluation of nuclear imaging showed no evidence of ischemia or infarct and ejection fraction of 70%.  ?  Echocardiogram February 15, 2024 demonstrated left ventricle normal size and function with ejection fraction of 55 to 60%. No significant valvular or structural abnormality noted.    CATHETERIZATION:

## 2024-11-12 NOTE — ED PROVIDER NOTE - PHYSICAL EXAMINATION
General: Awake, alert, lying in bed in NAD  HEENT: Normocephalic, atraumatic. No scleral icterus or conjunctival injection. EOMI. Moist mucous membranes. Oropharynx clear.   Neck:. Soft and supple. No JVD.   Cardiac: RRR, Peripheral pulses 2+ and symmetric. No LE edema.  Resp: Lungs CTAB. No accessory muscle use  Abd: Soft, non-tender, non-distended. No guarding, rebound, or rigidity.  Back: Spine midline and non-tender.   Skin: Petechia 3x3cm over anterior right ankle. No rashes, abrasions, or lacerations.  Neuro: AO x 4. Moves all extremities symmetrically. Motor strength and sensation grossly intact.  Psych: Appropriate mood and affect

## 2024-11-19 ENCOUNTER — NON-APPOINTMENT (OUTPATIENT)
Age: 64
End: 2024-11-19

## 2024-11-21 ENCOUNTER — APPOINTMENT (OUTPATIENT)
Dept: OBGYN | Facility: CLINIC | Age: 64
End: 2024-11-21

## 2024-11-25 ENCOUNTER — NON-APPOINTMENT (OUTPATIENT)
Age: 64
End: 2024-11-25

## 2024-11-25 ENCOUNTER — APPOINTMENT (OUTPATIENT)
Dept: CARDIOLOGY | Facility: CLINIC | Age: 64
End: 2024-11-25
Payer: COMMERCIAL

## 2024-11-25 VITALS
DIASTOLIC BLOOD PRESSURE: 74 MMHG | HEART RATE: 71 BPM | SYSTOLIC BLOOD PRESSURE: 111 MMHG | HEIGHT: 59 IN | BODY MASS INDEX: 37.9 KG/M2 | RESPIRATION RATE: 16 BRPM | WEIGHT: 188 LBS

## 2024-11-25 DIAGNOSIS — I48.91 UNSPECIFIED ATRIAL FIBRILLATION: ICD-10-CM

## 2024-11-25 DIAGNOSIS — M94.0 CHONDROCOSTAL JUNCTION SYNDROME [TIETZE]: ICD-10-CM

## 2024-11-25 DIAGNOSIS — E78.5 HYPERLIPIDEMIA, UNSPECIFIED: ICD-10-CM

## 2024-11-25 DIAGNOSIS — I25.10 ATHEROSCLEROTIC HEART DISEASE OF NATIVE CORONARY ARTERY W/OUT ANGINA PECTORIS: ICD-10-CM

## 2024-11-25 PROCEDURE — 99214 OFFICE O/P EST MOD 30 MIN: CPT

## 2024-11-25 PROCEDURE — 93000 ELECTROCARDIOGRAM COMPLETE: CPT

## 2024-11-25 PROCEDURE — G2211 COMPLEX E/M VISIT ADD ON: CPT | Mod: NC

## 2024-11-25 RX ORDER — DILTIAZEM HYDROCHLORIDE 180 MG/1
180 CAPSULE, EXTENDED RELEASE ORAL
Refills: 0 | Status: ACTIVE | COMMUNITY

## 2024-12-16 ENCOUNTER — APPOINTMENT (OUTPATIENT)
Dept: OBGYN | Facility: CLINIC | Age: 64
End: 2024-12-16
Payer: COMMERCIAL

## 2024-12-16 VITALS
HEIGHT: 59 IN | DIASTOLIC BLOOD PRESSURE: 78 MMHG | WEIGHT: 190 LBS | BODY MASS INDEX: 38.3 KG/M2 | SYSTOLIC BLOOD PRESSURE: 122 MMHG

## 2024-12-16 DIAGNOSIS — Z01.419 ENCOUNTER FOR GYNECOLOGICAL EXAMINATION (GENERAL) (ROUTINE) W/OUT ABNORMAL FINDINGS: ICD-10-CM

## 2024-12-16 PROCEDURE — 99396 PREV VISIT EST AGE 40-64: CPT

## 2024-12-17 ENCOUNTER — APPOINTMENT (OUTPATIENT)
Dept: UROLOGY | Facility: CLINIC | Age: 64
End: 2024-12-17

## 2024-12-18 LAB
BACTERIA UR CULT: NORMAL
HPV HIGH+LOW RISK DNA PNL CVX: NOT DETECTED

## 2024-12-27 LAB — CYTOLOGY CVX/VAG DOC THIN PREP: ABNORMAL

## 2025-01-21 ENCOUNTER — NON-APPOINTMENT (OUTPATIENT)
Age: 65
End: 2025-01-21

## 2025-03-05 ENCOUNTER — APPOINTMENT (OUTPATIENT)
Dept: UROLOGY | Facility: CLINIC | Age: 65
End: 2025-03-05
Payer: COMMERCIAL

## 2025-03-05 ENCOUNTER — NON-APPOINTMENT (OUTPATIENT)
Age: 65
End: 2025-03-05

## 2025-03-05 VITALS
BODY MASS INDEX: 37.9 KG/M2 | DIASTOLIC BLOOD PRESSURE: 68 MMHG | OXYGEN SATURATION: 95 % | RESPIRATION RATE: 16 BRPM | SYSTOLIC BLOOD PRESSURE: 106 MMHG | WEIGHT: 188 LBS | HEART RATE: 58 BPM | HEIGHT: 59 IN

## 2025-03-05 DIAGNOSIS — N89.8 OTHER SPECIFIED NONINFLAMMATORY DISORDERS OF VAGINA: ICD-10-CM

## 2025-03-05 DIAGNOSIS — N20.0 CALCULUS OF KIDNEY: ICD-10-CM

## 2025-03-05 DIAGNOSIS — I25.10 ATHEROSCLEROTIC HEART DISEASE OF NATIVE CORONARY ARTERY W/OUT ANGINA PECTORIS: ICD-10-CM

## 2025-03-05 PROCEDURE — 99214 OFFICE O/P EST MOD 30 MIN: CPT

## 2025-03-05 RX ORDER — DILTIAZEM HYDROCHLORIDE 180 MG/1
180 CAPSULE, EXTENDED RELEASE ORAL
Refills: 0 | Status: ACTIVE | COMMUNITY

## 2025-03-05 RX ORDER — SOTALOL HYDROCHLORIDE 120 MG/1
TABLET ORAL
Refills: 0 | Status: ACTIVE | COMMUNITY

## 2025-03-06 LAB
APPEARANCE: CLEAR
BILIRUBIN URINE: NEGATIVE
BLOOD URINE: NEGATIVE
COLOR: YELLOW
GLUCOSE QUALITATIVE U: NEGATIVE MG/DL
KETONES URINE: NEGATIVE MG/DL
LEUKOCYTE ESTERASE URINE: NEGATIVE
NITRITE URINE: NEGATIVE
PH URINE: 6.5
PROTEIN URINE: NEGATIVE MG/DL
SPECIFIC GRAVITY URINE: 1.02
UROBILINOGEN URINE: 0.2 MG/DL

## 2025-04-03 ENCOUNTER — NON-APPOINTMENT (OUTPATIENT)
Age: 65
End: 2025-04-03

## 2025-04-07 ENCOUNTER — NON-APPOINTMENT (OUTPATIENT)
Age: 65
End: 2025-04-07

## 2025-04-07 ENCOUNTER — APPOINTMENT (OUTPATIENT)
Dept: CARDIOLOGY | Facility: CLINIC | Age: 65
End: 2025-04-07
Payer: COMMERCIAL

## 2025-04-07 VITALS
SYSTOLIC BLOOD PRESSURE: 137 MMHG | WEIGHT: 190 LBS | BODY MASS INDEX: 38.3 KG/M2 | HEIGHT: 59 IN | HEART RATE: 54 BPM | DIASTOLIC BLOOD PRESSURE: 82 MMHG

## 2025-04-07 DIAGNOSIS — I49.3 VENTRICULAR PREMATURE DEPOLARIZATION: ICD-10-CM

## 2025-04-07 DIAGNOSIS — E78.5 HYPERLIPIDEMIA, UNSPECIFIED: ICD-10-CM

## 2025-04-07 DIAGNOSIS — E66.9 OBESITY, UNSPECIFIED: ICD-10-CM

## 2025-04-07 DIAGNOSIS — I48.91 UNSPECIFIED ATRIAL FIBRILLATION: ICD-10-CM

## 2025-04-07 PROCEDURE — 99215 OFFICE O/P EST HI 40 MIN: CPT

## 2025-04-07 PROCEDURE — 93000 ELECTROCARDIOGRAM COMPLETE: CPT

## 2025-04-07 RX ORDER — CLOPIDOGREL BISULFATE 75 MG/1
75 TABLET, FILM COATED ORAL DAILY
Qty: 90 | Refills: 1 | Status: ACTIVE | COMMUNITY
Start: 2025-04-07 | End: 1900-01-01

## 2025-04-07 RX ORDER — LORATADINE 10 MG/1
10 TABLET ORAL
Qty: 30 | Refills: 0 | Status: ACTIVE | COMMUNITY
Start: 2025-02-22

## 2025-04-30 RX ORDER — SEMAGLUTIDE 0.25 MG/.5ML
0.25 INJECTION, SOLUTION SUBCUTANEOUS
Qty: 4 | Refills: 1 | Status: ACTIVE | COMMUNITY
Start: 2025-04-30 | End: 1900-01-01

## 2025-06-17 ENCOUNTER — APPOINTMENT (OUTPATIENT)
Dept: CARDIOLOGY | Facility: CLINIC | Age: 65
End: 2025-06-17
Payer: COMMERCIAL

## 2025-06-17 VITALS
HEIGHT: 59 IN | BODY MASS INDEX: 38.3 KG/M2 | RESPIRATION RATE: 16 BRPM | SYSTOLIC BLOOD PRESSURE: 109 MMHG | WEIGHT: 190 LBS | DIASTOLIC BLOOD PRESSURE: 73 MMHG | HEART RATE: 61 BPM

## 2025-06-17 PROCEDURE — 93000 ELECTROCARDIOGRAM COMPLETE: CPT

## 2025-06-17 PROCEDURE — 99214 OFFICE O/P EST MOD 30 MIN: CPT

## 2025-07-10 ENCOUNTER — OFFICE (OUTPATIENT)
Dept: URBAN - METROPOLITAN AREA CLINIC 114 | Facility: CLINIC | Age: 65
Setting detail: OPHTHALMOLOGY
End: 2025-07-10
Payer: COMMERCIAL

## 2025-07-10 DIAGNOSIS — H25.13: ICD-10-CM

## 2025-07-10 PROCEDURE — 92014 COMPRE OPH EXAM EST PT 1/>: CPT | Performed by: SPECIALIST

## 2025-07-10 ASSESSMENT — REFRACTION_AUTOREFRACTION
OS_SPHERE: -2.00
OD_AXIS: 160
OS_AXIS: 177
OS_CYLINDER: -1.25
OD_CYLINDER: -1.25
OD_SPHERE: -3.00

## 2025-07-10 ASSESSMENT — REFRACTION_CURRENTRX
OD_SPHERE: -2.25
OD_OVR_VA: 20/
OS_OVR_VA: 20/
OD_ADD: +1.25
OS_ADD: +1.25
OD_AXIS: 5
OD_CYLINDER: -1.00
OS_SPHERE: -1.25
OS_AXIS: 172
OS_CYLINDER: -1.25

## 2025-07-10 ASSESSMENT — REFRACTION_MANIFEST
OD_ADD: +2.00
OD_CYLINDER: -1.00
OD_AXIS: 165
OD_SPHERE: -3.00
OS_VA1: 20/20
OS_VA2: 20/20(J1+)
OS_SPHERE: -1.25
OS_CYLINDER: -1.25
OS_ADD: +2.00
OD_VA1: 20/20-
OD_VA2: 20/20(J1+)
OS_AXIS: 175

## 2025-07-10 ASSESSMENT — TONOMETRY
OS_IOP_MMHG: 17
OD_IOP_MMHG: 17

## 2025-07-10 ASSESSMENT — CONFRONTATIONAL VISUAL FIELD TEST (CVF)
OS_FINDINGS: FULL
OD_FINDINGS: FULL

## 2025-07-10 ASSESSMENT — VISUAL ACUITY
OD_BCVA: 20/20
OS_BCVA: 20/40

## 2025-08-06 ENCOUNTER — RX RENEWAL (OUTPATIENT)
Age: 65
End: 2025-08-06

## 2025-09-08 ENCOUNTER — APPOINTMENT (OUTPATIENT)
Dept: UROLOGY | Facility: CLINIC | Age: 65
End: 2025-09-08

## 2025-09-17 ENCOUNTER — RX RENEWAL (OUTPATIENT)
Age: 65
End: 2025-09-17

## (undated) DEVICE — ADAPTER CHECK FLO 9FR STERILE

## (undated) DEVICE — ACMI SELF-SEALING SEAL UP TO 7FR

## (undated) DEVICE — SYR ASEPTO

## (undated) DEVICE — SOL IRR POUR H2O 1500ML

## (undated) DEVICE — PRESSURE INFUSOR BAG 3000ML

## (undated) DEVICE — VENODYNE/SCD SLEEVE CALF LARGE

## (undated) DEVICE — GLV 8 PROTEXIS (WHITE)

## (undated) DEVICE — GLV 7 PROTEXIS (WHITE)

## (undated) DEVICE — SOL IRR BAG NS 0.9% 3000ML

## (undated) DEVICE — POSITIONER FOAM EGG CRATE ULNAR 2PCS (PINK)

## (undated) DEVICE — GLV 6.5 PROTEXIS (WHITE)

## (undated) DEVICE — SOL IRR BAG H2O 3000ML

## (undated) DEVICE — WARMING BLANKET UPPER ADULT

## (undated) DEVICE — DRAPE EQUIPMENT BANDED BAG 30 X 30" (SHOWER CAP)

## (undated) DEVICE — IRR BULB PATHFINDER + 10"

## (undated) DEVICE — FOLEY HOLDER STATLOCK 2 WAY ADULT

## (undated) DEVICE — PACK CYSTO

## (undated) DEVICE — GOWN TRIMAX LG

## (undated) DEVICE — TUBING SUCTION 20FT

## (undated) DEVICE — GLV 7.5 PROTEXIS (WHITE)

## (undated) DEVICE — POSITIONER FOAM HEADREST (PINK)

## (undated) DEVICE — TUBING RANGER FLUID IRRIGATION SET DISP